# Patient Record
Sex: FEMALE | Race: WHITE | NOT HISPANIC OR LATINO | Employment: FULL TIME | URBAN - METROPOLITAN AREA
[De-identification: names, ages, dates, MRNs, and addresses within clinical notes are randomized per-mention and may not be internally consistent; named-entity substitution may affect disease eponyms.]

---

## 2017-04-04 ENCOUNTER — MYC MEDICAL ADVICE (OUTPATIENT)
Dept: FAMILY MEDICINE | Facility: CLINIC | Age: 32
End: 2017-04-04

## 2017-04-21 ASSESSMENT — PATIENT HEALTH QUESTIONNAIRE - PHQ9: 5. POOR APPETITE OR OVEREATING: SEVERAL DAYS

## 2017-04-21 ASSESSMENT — ANXIETY QUESTIONNAIRES
5. BEING SO RESTLESS THAT IT IS HARD TO SIT STILL: SEVERAL DAYS
GAD7 TOTAL SCORE: 4
3. WORRYING TOO MUCH ABOUT DIFFERENT THINGS: NOT AT ALL
6. BECOMING EASILY ANNOYED OR IRRITABLE: SEVERAL DAYS
2. NOT BEING ABLE TO STOP OR CONTROL WORRYING: NOT AT ALL
1. FEELING NERVOUS, ANXIOUS, OR ON EDGE: SEVERAL DAYS
7. FEELING AFRAID AS IF SOMETHING AWFUL MIGHT HAPPEN: NOT AT ALL

## 2017-04-22 ASSESSMENT — PATIENT HEALTH QUESTIONNAIRE - PHQ9: SUM OF ALL RESPONSES TO PHQ QUESTIONS 1-9: 1

## 2017-04-22 ASSESSMENT — ANXIETY QUESTIONNAIRES: GAD7 TOTAL SCORE: 4

## 2017-06-18 ENCOUNTER — NURSE TRIAGE (OUTPATIENT)
Dept: NURSING | Facility: CLINIC | Age: 32
End: 2017-06-18

## 2017-06-18 NOTE — TELEPHONE ENCOUNTER
"Lindsay states she thinks she might have been bitten by a tick 2 days ago. Noticed a scab on her upper thigh, left leg, about 5 hours after walking in the woods. She then questioned if the \"scab\" was actually a tick. Described as a black dot on her thigh the she scratched off with her fingernail, and then bled a little.  Did not ever see a tick that she knows of.   Additional Information    Tick bite with no complications (all triage questions negative)    Protocols used: TICK BITE-ADULT-    "

## 2017-09-20 ENCOUNTER — OFFICE VISIT (OUTPATIENT)
Dept: FAMILY MEDICINE | Facility: CLINIC | Age: 32
End: 2017-09-20
Payer: COMMERCIAL

## 2017-09-20 VITALS
RESPIRATION RATE: 20 BRPM | HEART RATE: 78 BPM | DIASTOLIC BLOOD PRESSURE: 80 MMHG | TEMPERATURE: 98.1 F | HEIGHT: 71 IN | WEIGHT: 177 LBS | OXYGEN SATURATION: 99 % | SYSTOLIC BLOOD PRESSURE: 127 MMHG | BODY MASS INDEX: 24.78 KG/M2

## 2017-09-20 DIAGNOSIS — J02.9 SORE THROAT: Primary | ICD-10-CM

## 2017-09-20 LAB
BASOPHILS # BLD AUTO: 0 10E9/L (ref 0–0.2)
BASOPHILS NFR BLD AUTO: 0.2 %
DEPRECATED S PYO AG THROAT QL EIA: NORMAL
DIFFERENTIAL METHOD BLD: NORMAL
EOSINOPHIL # BLD AUTO: 0.1 10E9/L (ref 0–0.7)
EOSINOPHIL NFR BLD AUTO: 0.7 %
LYMPHOCYTES # BLD AUTO: 1 10E9/L (ref 0.8–5.3)
LYMPHOCYTES NFR BLD AUTO: 12 %
MONOCYTES # BLD AUTO: 1 10E9/L (ref 0–1.3)
MONOCYTES NFR BLD AUTO: 12.7 %
NEUTROPHILS # BLD AUTO: 6.1 10E9/L (ref 1.6–8.3)
NEUTROPHILS NFR BLD AUTO: 74.4 %
SPECIMEN SOURCE: NORMAL
WBC # BLD AUTO: 8.2 10E9/L (ref 4–11)

## 2017-09-20 PROCEDURE — 85004 AUTOMATED DIFF WBC COUNT: CPT | Performed by: NURSE PRACTITIONER

## 2017-09-20 PROCEDURE — 99213 OFFICE O/P EST LOW 20 MIN: CPT | Performed by: NURSE PRACTITIONER

## 2017-09-20 PROCEDURE — 87081 CULTURE SCREEN ONLY: CPT | Performed by: NURSE PRACTITIONER

## 2017-09-20 PROCEDURE — 85048 AUTOMATED LEUKOCYTE COUNT: CPT | Performed by: NURSE PRACTITIONER

## 2017-09-20 PROCEDURE — 36415 COLL VENOUS BLD VENIPUNCTURE: CPT | Performed by: NURSE PRACTITIONER

## 2017-09-20 PROCEDURE — 87880 STREP A ASSAY W/OPTIC: CPT | Performed by: NURSE PRACTITIONER

## 2017-09-20 NOTE — PATIENT INSTRUCTIONS

## 2017-09-20 NOTE — PROGRESS NOTES
SUBJECTIVE:   Lindsay Burch is a 31 year old female who presents to clinic today for the following health issues:      URI SYMPTOMS    Duration: three days     Description  Lindsay reports that she has been sick for 3 days. When her symptoms the symptoms that she started with a head cold, red irritated eyes, and a sore throat.   She was seen by her eye doctor and was told that her eye symptoms were viral.  She is in clinic today because of worsening sore throat/throat pain, headache and bilateral ear pain.  Congested.   No fever.  Eating and drinking okay.     Severity: severe    Accompanying signs and symptoms: None    History (predisposing factors):  none    Precipitating or alleviating factors: None    Therapies tried and outcome:  Tylenol     Problem list and histories reviewed & adjusted, as indicated.  Additional history: as documented    Patient Active Problem List   Diagnosis     Anxiety     Dysthymia     Hypothyroidism, unspecified hypothyroidism type     Past Surgical History:   Procedure Laterality Date     OPERATIVE HYSTEROSCOPY WITH MORCELLATOR  1/2/2014    Procedure: OPERATIVE HYSTEROSCOPY WITH MORCELLATOR;  OPERATIVE HYSTEROSCOPY, RESECTION OF ENDOMETRIAL POLYP;  Surgeon: Elizabeth Stover MD;  Location: [facility]     wisdom teeth[         Social History   Substance Use Topics     Smoking status: Never Smoker     Smokeless tobacco: Never Used     Alcohol use 0.0 oz/week     0 Standard drinks or equivalent per week      Comment: occasional     Family History   Problem Relation Age of Onset     Hypertension Mother      Hypertension Father          Current Outpatient Prescriptions   Medication Sig Dispense Refill     sertraline (ZOLOFT) 50 MG tablet Take 1.5 tablets (75 mg) by mouth daily 45 tablet 1     levothyroxine (SYNTHROID, LEVOTHROID) 88 MCG tablet Take 1 tablet (88 mcg) by mouth daily 90 tablet 0     VITAMIN D, CHOLECALCIFEROL, PO Take 5,000 Units by mouth daily       Omega-3 Fatty Acids  "(OMEGA-3 FISH OIL PO) Take by mouth daily       PRENATAL VITAMINS PO        No Known Allergies  Recent Labs   Lab Test  04/11/16   1459  03/10/16   1034  05/20/10   LDL   --    --    --   91   HDL   --    --    --   68   TRIG   --    --    --   52   TSH  3.64  3.32   < >   --     < > = values in this interval not displayed.      BP Readings from Last 3 Encounters:   09/20/17 127/80   07/01/16 114/72   04/11/16 98/82    Wt Readings from Last 3 Encounters:   09/20/17 177 lb (80.3 kg)   07/01/16 159 lb (72.1 kg)   04/11/16 167 lb (75.8 kg)            Labs reviewed in EPIC    Reviewed and updated as needed this visit by clinical staff       Reviewed and updated as needed this visit by Provider         ROS:  Constitutional, HEENT, cardiovascular, pulmonary, GI, , musculoskeletal, neuro, skin, endocrine and psych systems are negative, except as otherwise noted.      OBJECTIVE:   /80  Pulse 78  Temp 98.1  F (36.7  C) (Oral)  Resp 20  Ht 5' 10.5\" (1.791 m)  Wt 177 lb (80.3 kg)  SpO2 99%  Breastfeeding? Yes  BMI 25.04 kg/m2  Body mass index is 25.04 kg/(m^2).  General: healthy, alert and moderate distress, tearful  Skin is warm, dry. No rashes present.  HEENT: bilateral TM normal without fluid or erythema, neck has bilateral anterior cervical nodes enlarged and pharynx erythematous without exudate.  Lungs: respirations easy and regular. chest clear to ausculation bilaterally  Heart: S1, S2 normal, no murmur, no gallop, rate regular  Psyche: Good eye contact, tearful at times    Results for orders placed or performed in visit on 09/20/17   WBC with Diff   Result Value Ref Range    WBC 8.2 4.0 - 11.0 10e9/L    Diff Method Automated Method     % Neutrophils 74.4 %    % Lymphocytes 12.0 %    % Monocytes 12.7 %    % Eosinophils 0.7 %    % Basophils 0.2 %    Absolute Neutrophil 6.1 1.6 - 8.3 10e9/L    Absolute Lymphocytes 1.0 0.8 - 5.3 10e9/L    Absolute Monocytes 1.0 0.0 - 1.3 10e9/L    Absolute Eosinophils 0.1 " 0.0 - 0.7 10e9/L    Absolute Basophils 0.0 0.0 - 0.2 10e9/L   Strep, Rapid Screen   Result Value Ref Range    Specimen Description Throat     Rapid Strep A Screen       NEGATIVE: No Group A streptococcal antigen detected by immunoassay, await culture report.         ASSESSMENT/PLAN:     (J02.9) Sore throat  (primary encounter diagnosis)  Comment: Acute  Plan: Strep, Rapid Screen, Beta strep group A         culture, WBC with Diff          The patient was reassured today that her rapid strep test is negative and her total white count is normal. I reassured her that this appears to be a viral infection. She is tearful and clearly uncomfortable today, in night discuss with her that I hope this is the worst day for her. I recommend that she be regimen with pain control. I recommend Tylenol every 4 hours as needed. She can use ibuprofen intermittently for pain as well. Gargles, staying hydrated, throat lozenges, honey, Tea etc.  I encouraged healthy diet.  She is to monitor for red flag symptoms. If her symptoms worsen in anyway or her sore throat becomes more painful, I would consider an oral antibiotic for her. She is to let us know if her symptoms worsen.    RADHA Ramirez [facility]

## 2017-09-20 NOTE — MR AVS SNAPSHOT
After Visit Summary   9/20/2017    Lindsay Burch    MRN: [id number]           Patient Information     Date Of Birth          1985        Visit Information        Provider Department      9/20/2017 3:45 PM Geeta Sam APRN [facility]        Today's Diagnoses     Sore throat    -  1      Care Instructions       * PHARYNGITIS (Sore Throat),REPORT PENDING    Pharyngitis (sore throat) is often due to a virus, but can also be caused by the  strep  bacteria. This is called  strep throat . Both viral and strep infection can cause throat pain that is worse when swallowing, aching all over with headache and fever. Both types of infections are contagious. They may be spread by coughing, kissing or touching others after touching your mouth or nose, so wash your hands often.  A test has been done to determine whether or not you have strep throat. If it is positive for strep infection you will usually need to take antibiotics. If the test is negative, you probably have a viral pharyngitis, and antibiotic treatment will not help you recover.  HOME CARE:    If your symptoms are severe, rest at home for the first 2-3 days. If you are told that your test is positive for strep, you should be off work and school for the first two days of antibiotic treatment. After that, you will no longer be as contagious.    Children: Use acetaminophen (Tylenol) for fever, fussiness or discomfort. In infants over six months of age, you may use ibuprofen (Children's Motrin) instead of Tylenol. [NOTE: If your child has chronic liver or kidney disease or ever had a stomach ulcer or GI bleeding, talk with your doctor before using these medicines.]   (Aspirin should never be used in anyone under 18 years of age who is ill with a fever. It may cause severe liver damage.)  Adults: You may use acetaminophen (Tylenol) 650-1000 mg every 6 hours or ibuprofen (Motrin, Advil) 600 mg every 6-8 hours  with food to control pain, if you are able to take these medicines. [NOTE: If you have chronic liver or kidney disease or ever had a stomach ulcer or GI bleeding, talk with your doctor before using these medicines.]    Throat lozenges or sprays (Chloraseptic and others), or gargling with warm salt water will reduce throat pain. Dissolve 1/2 teaspoon of salt in 1 glass of warm water. This is especially useful just before meals.     FOLLOW UP with your doctor as advised by our staff if you are not improving over the next week.  GET PROMPT MEDICAL ATTENTION  if any of the following occur:    Fever over 101 F (38.3 C) for more than three days    New or worsening ear pain, sinus pain or headache    Unable to swallow liquids or open your mouth wide due to throat pain    Trouble breathing    Muffled voice    New rash       [id number] [facility], [address] [place] [address]. All rights reserved. This information is not intended as a substitute for professional medical care. Always follow your healthcare professional's instructions.            Follow-ups after your visit        Your next 10 appointments already scheduled     Sep 20, 2017  3:45 PM CDT   SHORT with RADHA Chester CNP   [facility]    [address] [place] [id number] [phone]              Who to contact     If you have questions or need follow up information about today's clinic visit or your schedule please contact [facility] directly at [phone].  Normal or non-critical lab and imaging results will be communicated to you by [employer], letter or phone within 4 business days after the clinic has received the results. If you do not hear from us within 7 days, please contact the clinic through [employer] or phone. If you have a critical or abnormal lab result, we will notify you by phone as soon as possible.  Submit refill requests through [employer]  "or call your pharmacy and they will forward the refill request to us. Please allow 3 business days for your refill to be completed.          Additional Information About Your Visit        MyChart Information     [employer] gives you secure access to your electronic health record. If you see a primary care provider, you can also send messages to your care team and make appointments. If you have questions, please call your primary care clinic.  If you do not have a primary care provider, please call [phone] and they will assist you.        Care EveryWhere ID     This is your Care EveryWhere ID. This could be used by other organizations to access your [place] medical records  [id number]        Your Vitals Were     Pulse Temperature Respirations Height Pulse Oximetry Breastfeeding?    78 98.1  F (36.7  C) (Oral) 20 5' 10.5\" (1.791 m) 99% Yes    BMI (Body Mass Index)                   25.04 kg/m2            Blood Pressure from Last 3 Encounters:   09/20/17 127/80   07/01/16 114/72   04/11/16 98/82    Weight from Last 3 Encounters:   09/20/17 177 lb (80.3 kg)   07/01/16 159 lb (72.1 kg)   04/11/16 167 lb (75.8 kg)              We Performed the Following     Beta strep group A culture     Strep, Rapid Screen     WBC with Diff        Primary Care Provider Office Phone # Fax #    Shruti RONALD Lim [phone] [address] [facility] [address]        Equal Access to Services     [facility] : Hadii aad ku hadasho Soomaali, waaxda luqadaha, qaybta kaalmada adeegyada, waxay fatuma mariano ademiguel a byers . So [facility] [phone].    ATENCIÓN: Si habla español, tiene a zelaya disposición servicios gratuitos de asistencia lingüística. Ej al [phone].    We comply with applicable federal civil rights laws and [place] laws. We do not discriminate on the basis of race, color, national origin, age, disability sex, sexual orientation or gender identity.            Thank you!     Thank you for " choosing [facility]  for your care. Our goal is always to provide you with excellent care. Hearing back from our patients is one way we can continue to improve our services. Please take a few minutes to complete the written survey that you may receive in the mail after your visit with us. Thank you!             Your Updated Medication List - Protect others around you: Learn how to safely use, store and throw away your medicines at www.disposemymeds.org.          This list is accurate as of: 9/20/17  2:53 PM.  Always use your most recent med list.                   Brand Name Dispense Instructions for use Diagnosis    levothyroxine 88 MCG tablet    SYNTHROID/LEVOTHROID    90 tablet    Take 1 tablet (88 mcg) by mouth daily    Hypothyroidism, unspecified hypothyroidism type       OMEGA-3 FISH OIL PO      Take by mouth daily        PRENATAL VITAMINS PO           sertraline 50 MG tablet    ZOLOFT    45 tablet    Take 1.5 tablets (75 mg) by mouth daily    Dysthymia, Anxiety       VITAMIN D (CHOLECALCIFEROL) PO      Take 5,000 Units by mouth daily

## 2017-09-21 LAB
BACTERIA SPEC CULT: NORMAL
SPECIMEN SOURCE: NORMAL

## 2017-10-03 ENCOUNTER — OFFICE VISIT (OUTPATIENT)
Dept: FAMILY MEDICINE | Facility: CLINIC | Age: 32
End: 2017-10-03
Payer: COMMERCIAL

## 2017-10-03 VITALS
TEMPERATURE: 98.6 F | RESPIRATION RATE: 16 BRPM | OXYGEN SATURATION: 98 % | BODY MASS INDEX: 24.61 KG/M2 | HEART RATE: 74 BPM | WEIGHT: 174 LBS

## 2017-10-03 DIAGNOSIS — R07.0 THROAT PAIN: Primary | ICD-10-CM

## 2017-10-03 DIAGNOSIS — J06.9 UPPER RESPIRATORY TRACT INFECTION, UNSPECIFIED TYPE: ICD-10-CM

## 2017-10-03 LAB
BASOPHILS # BLD AUTO: 0 10E9/L (ref 0–0.2)
BASOPHILS NFR BLD AUTO: 0.2 %
DEPRECATED S PYO AG THROAT QL EIA: NORMAL
DIFFERENTIAL METHOD BLD: NORMAL
EOSINOPHIL # BLD AUTO: 0.2 10E9/L (ref 0–0.7)
EOSINOPHIL NFR BLD AUTO: 1.7 %
HETEROPH AB SER QL: NEGATIVE
LYMPHOCYTES # BLD AUTO: 1.1 10E9/L (ref 0.8–5.3)
LYMPHOCYTES NFR BLD AUTO: 10.9 %
MONOCYTES # BLD AUTO: 0.8 10E9/L (ref 0–1.3)
MONOCYTES NFR BLD AUTO: 7.5 %
NEUTROPHILS # BLD AUTO: 8.2 10E9/L (ref 1.6–8.3)
NEUTROPHILS NFR BLD AUTO: 79.7 %
SPECIMEN SOURCE: NORMAL
WBC # BLD AUTO: 10.3 10E9/L (ref 4–11)

## 2017-10-03 PROCEDURE — 87081 CULTURE SCREEN ONLY: CPT | Performed by: NURSE PRACTITIONER

## 2017-10-03 PROCEDURE — 87880 STREP A ASSAY W/OPTIC: CPT | Performed by: NURSE PRACTITIONER

## 2017-10-03 PROCEDURE — 86308 HETEROPHILE ANTIBODY SCREEN: CPT | Performed by: NURSE PRACTITIONER

## 2017-10-03 PROCEDURE — 85004 AUTOMATED DIFF WBC COUNT: CPT | Performed by: NURSE PRACTITIONER

## 2017-10-03 PROCEDURE — 36415 COLL VENOUS BLD VENIPUNCTURE: CPT | Performed by: NURSE PRACTITIONER

## 2017-10-03 PROCEDURE — 85048 AUTOMATED LEUKOCYTE COUNT: CPT | Performed by: NURSE PRACTITIONER

## 2017-10-03 PROCEDURE — 99213 OFFICE O/P EST LOW 20 MIN: CPT | Performed by: NURSE PRACTITIONER

## 2017-10-03 RX ORDER — AMOXICILLIN 875 MG
875 TABLET ORAL 2 TIMES DAILY
Qty: 20 TABLET | Refills: 0 | Status: SHIPPED | OUTPATIENT
Start: 2017-10-03 | End: 2018-11-12

## 2017-10-03 ASSESSMENT — PATIENT HEALTH QUESTIONNAIRE - PHQ9: SUM OF ALL RESPONSES TO PHQ QUESTIONS 1-9: 11

## 2017-10-03 NOTE — MR AVS SNAPSHOT
After Visit Summary   10/3/2017    Lindsay Burch    MRN: [id number]           Patient Information     Date Of Birth          1985        Visit Information        Provider Department      10/3/2017 8:40 AM Geeta Sam APRN CNP [facility]        Today's Diagnoses     Throat pain    -  1    Upper respiratory tract infection, unspecified type           Follow-ups after your visit        Who to contact     If you have questions or need follow up information about today's clinic visit or your schedule please contact [facility] directly at [phone].  Normal or non-critical lab and imaging results will be communicated to you by MyChart, letter or phone within 4 business days after the clinic has received the results. If you do not hear from us within 7 days, please contact the clinic through [employer] or phone. If you have a critical or abnormal lab result, we will notify you by phone as soon as possible.  Submit refill requests through [employer] or call your pharmacy and they will forward the refill request to us. Please allow 3 business days for your refill to be completed.          Additional Information About Your Visit        MyChart Information     [employer] gives you secure access to your electronic health record. If you see a primary care provider, you can also send messages to your care team and make appointments. If you have questions, please call your primary care clinic.  If you do not have a primary care provider, please call [phone] and they will assist you.        Care EveryWhere ID     This is your Care EveryWhere ID. This could be used by other organizations to access your [place] medical records  [id number]        Your Vitals Were     Pulse Temperature Respirations Pulse Oximetry Breastfeeding? BMI (Body Mass Index)    74 98.6  F (37  C) (Oral) 16 98% Yes 24.61 kg/m2       Blood Pressure from Last 3 Encounters:   09/20/17  127/80   07/01/16 114/72   04/11/16 98/82    Weight from Last 3 Encounters:   10/03/17 174 lb (78.9 kg)   09/20/17 177 lb (80.3 kg)   07/01/16 159 lb (72.1 kg)              We Performed the Following     Beta strep group A culture     Mononucleosis screen     Strep, Rapid Screen     WBC with Diff          Today's Medication Changes          These changes are accurate as of: 10/3/17 11:18 AM.  If you have any questions, ask your nurse or doctor.               Start taking these medicines.        Dose/Directions    amoxicillin 875 MG tablet   Commonly known as:  AMOXIL   Used for:  Throat pain   Started by:  Geeta Sam APRN CNP        Dose:  875 mg   Take 1 tablet (875 mg) by mouth 2 times daily   Quantity:  20 tablet   Refills:  0            Where to get your medicines      These medications were sent to [place], [place] - [address]  [address] [facility] [address]     Phone:  [phone]     amoxicillin 875 MG tablet                Primary Care Provider Office Phone # Fax #    Shruti Lim [phone] [address] [facility] [[email or web address] A  [facility] [address]        Equal Access to Services     [facility]: Hadii aad ku hadasho Soomaali, waaxda luqadaha, qaybta kaalmada adeegyada, waxay idiin haysharifn baltazar starkey. So [facility] [phone].    ATENCIÓN: Si habla español, tiene a zelaya disposición servicios gratuitos de asistencia lingüística. Llame al [phone].    We comply with applicable federal civil rights laws and [place] laws. We do not discriminate on the basis of race, color, national origin, age, disability, sex, sexual orientation, or gender identity.            Thank you!     Thank you for choosing [facility]  for your care. Our goal is always to provide you with excellent care. Hearing back from our patients is one way we can continue to improve our services. Please take a few minutes to complete the written survey  that you may receive in the mail after your visit with us. Thank you!             Your Updated Medication List - Protect others around you: Learn how to safely use, store and throw away your medicines at www.disposemymeds.org.          This list is accurate as of: 10/3/17 11:18 AM.  Always use your most recent med list.                   Brand Name Dispense Instructions for use Diagnosis    amoxicillin 875 MG tablet    AMOXIL    20 tablet    Take 1 tablet (875 mg) by mouth 2 times daily    Throat pain       levothyroxine 88 MCG tablet    SYNTHROID/LEVOTHROID    90 tablet    Take 1 tablet (88 mcg) by mouth daily    Hypothyroidism, unspecified hypothyroidism type       OMEGA-3 FISH OIL PO      Take by mouth daily        PRENATAL VITAMINS PO           sertraline 50 MG tablet    ZOLOFT    45 tablet    Take 1.5 tablets (75 mg) by mouth daily    Dysthymia, Anxiety       VITAMIN D (CHOLECALCIFEROL) PO      Take 5,000 Units by mouth daily

## 2017-10-03 NOTE — PROGRESS NOTES
SUBJECTIVE:   Lindsay Burch is a 31 year old female who presents to clinic today for the following health issues:    RESPIRATORY SYMPTOMS      Duration: Saturday (9/30/2017)    Description  sore throat    Severity: severe    Accompanying signs and symptoms: recently ill- had improvement for one week.     History (predisposing factors):  strep exposure?    Precipitating or alleviating factors: None    Therapies tried and outcome:  Tylenol, ibuprofen, tea and lozenges.      HPI: Patient presents to the clinic for evaluation of sore throat.  She reports she had similar symptoms 2 weeks ago for which she was seen in the clinic.  Her work up was negative at that time and she was ill for approximately a week. Her symptoms did improve.  She was feeling better but her symptoms returned this Saturday and have progressively and quickly gotten worse.  She reports a sore throat, swollen lymph nodes, difficulty swallowing, and fatigue.  She denies fever, cough, runny nose, or congestion.  She does have mild ear pain.  She has used tylenol, ibuprofen, tea, and lozenges without relief.    2 small children at home/breastfeeding.     Problem list and histories reviewed & adjusted, as indicated.  Additional history: as documented    Patient Active Problem List   Diagnosis     Anxiety     Dysthymia     Hypothyroidism, unspecified hypothyroidism type     Past Surgical History:   Procedure Laterality Date     OPERATIVE HYSTEROSCOPY WITH MORCELLATOR  1/2/2014    Procedure: OPERATIVE HYSTEROSCOPY WITH MORCELLATOR;  OPERATIVE HYSTEROSCOPY, RESECTION OF ENDOMETRIAL POLYP;  Surgeon: Elizabeth Stover MD;  [facility]     wisdom teeth[         Social History   Substance Use Topics     Smoking status: Never Smoker     Smokeless tobacco: Never Used     Alcohol use 0.0 oz/week     0 Standard drinks or equivalent per week      Comment: occasional     Family History   Problem Relation Age of Onset     Hypertension Mother       Hypertension Father          Current Outpatient Prescriptions   Medication Sig Dispense Refill     amoxicillin (AMOXIL) 875 MG tablet Take 1 tablet (875 mg) by mouth 2 times daily 20 tablet 0     sertraline (ZOLOFT) 50 MG tablet Take 1.5 tablets (75 mg) by mouth daily 45 tablet 1     levothyroxine (SYNTHROID, LEVOTHROID) 88 MCG tablet Take 1 tablet (88 mcg) by mouth daily 90 tablet 0     VITAMIN D, CHOLECALCIFEROL, PO Take 5,000 Units by mouth daily       Omega-3 Fatty Acids (OMEGA-3 FISH OIL PO) Take by mouth daily       PRENATAL VITAMINS PO            Reviewed and updated as needed this visit by clinical staff     Reviewed and updated as needed this visit by Provider         ROS:  Constitutional, HEENT, cardiovascular, pulmonary, gi and gu systems are negative, except as otherwise noted.      OBJECTIVE:   Pulse 74  Temp 98.6  F (37  C) (Oral)  Resp 16  Wt 174 lb (78.9 kg)  SpO2 98%  Breastfeeding? Yes  BMI 24.61 kg/m2  Body mass index is 24.61 kg/(m^2).  GENERAL: healthy, alert and mild distress, tearful  EYES: Eyes grossly normal to inspection, PERRL and conjunctivae and sclerae normal  HENT: ear canals erythematous and fluid behind TM's, nose and mouth without ulcers or lesions, tonsils erythematous, Voice strained  NECK: Positive for adenopathy, no asymmetry, masses, or scars and thyroid normal to palpation  RESP: lungs clear to auscultation - no rales, rhonchi or wheezes  CV: regular rate and rhythm, normal S1 S2, no S3 or S4, no murmur, click or rub, no peripheral edema and peripheral pulses strong  ABDOMEN: soft, nontender, no hepatosplenomegaly, no masses and bowel sounds normal  MS: no gross musculoskeletal defects noted, no edema    Results for orders placed or performed in visit on 10/03/17   WBC with Diff   Result Value Ref Range    WBC 10.3 4.0 - 11.0 10e9/L    Diff Method Automated Method     % Neutrophils 79.7 %    % Lymphocytes 10.9 %    % Monocytes 7.5 %    % Eosinophils 1.7 %    % Basophils  0.2 %    Absolute Neutrophil 8.2 1.6 - 8.3 10e9/L    Absolute Lymphocytes 1.1 0.8 - 5.3 10e9/L    Absolute Monocytes 0.8 0.0 - 1.3 10e9/L    Absolute Eosinophils 0.2 0.0 - 0.7 10e9/L    Absolute Basophils 0.0 0.0 - 0.2 10e9/L   Mononucleosis screen   Result Value Ref Range    Mononucleosis Screen Negative NEG^Negative   Strep, Rapid Screen   Result Value Ref Range    Specimen Description Throat     Rapid Strep A Screen       NEGATIVE: No Group A streptococcal antigen detected by immunoassay, await culture report.       ASSESSMENT/PLAN:       1. Upper respiratory tract infection, unspecified type    2. Throat pain  - Strep, Rapid Screen  - Beta strep group A culture  - WBC with Diff  - Mononucleosis screen  - amoxicillin (AMOXIL) 875 MG tablet; Take 1 tablet (875 mg) by mouth 2 times daily  Dispense: 20 tablet; Refill: 0    Work-up for strep and mono negative.  White count normal. Given duration of patient's symptoms and exam, we are Ok prescribing amoxicillin to cover her for a sinus infection with specifically an acutely sore throat. She was instructed to notify us if her symptoms have not improved in 3-5 days. If necessary, an antibiotic change would be considered.    This note was scribed by Evangelina Warren RN, Student NP    In supervising the nurse practitioner student, I have reviewed the ROS and PSFH documented by the student.  I performed the pertinent history, exam and assessment and plan components as documented above.   RADHA Ramirez [facility]

## 2017-10-04 LAB
BACTERIA SPEC CULT: NORMAL
SPECIMEN SOURCE: NORMAL

## 2018-03-04 ENCOUNTER — HEALTH MAINTENANCE LETTER (OUTPATIENT)
Age: 33
End: 2018-03-04

## 2018-07-01 ENCOUNTER — TRANSFERRED RECORDS (OUTPATIENT)
Dept: HEALTH INFORMATION MANAGEMENT | Facility: CLINIC | Age: 33
End: 2018-07-01

## 2018-07-01 LAB — PAP SMEAR - HIM PATIENT REPORTED: NEGATIVE

## 2018-07-26 ENCOUNTER — TRANSFERRED RECORDS (OUTPATIENT)
Dept: HEALTH INFORMATION MANAGEMENT | Facility: CLINIC | Age: 33
End: 2018-07-26

## 2018-11-12 ENCOUNTER — OFFICE VISIT (OUTPATIENT)
Dept: FAMILY MEDICINE | Facility: CLINIC | Age: 33
End: 2018-11-12
Payer: COMMERCIAL

## 2018-11-12 VITALS
OXYGEN SATURATION: 98 % | HEIGHT: 71 IN | TEMPERATURE: 98.4 F | DIASTOLIC BLOOD PRESSURE: 89 MMHG | SYSTOLIC BLOOD PRESSURE: 134 MMHG | RESPIRATION RATE: 20 BRPM | WEIGHT: 170 LBS | BODY MASS INDEX: 23.8 KG/M2 | HEART RATE: 73 BPM

## 2018-11-12 DIAGNOSIS — Z23 NEED FOR PROPHYLACTIC VACCINATION AND INOCULATION AGAINST INFLUENZA: ICD-10-CM

## 2018-11-12 DIAGNOSIS — L30.8 OTHER ECZEMA: ICD-10-CM

## 2018-11-12 DIAGNOSIS — F41.9 ANXIETY: ICD-10-CM

## 2018-11-12 DIAGNOSIS — Z00.00 ROUTINE GENERAL MEDICAL EXAMINATION AT A HEALTH CARE FACILITY: Primary | ICD-10-CM

## 2018-11-12 PROCEDURE — 90686 IIV4 VACC NO PRSV 0.5 ML IM: CPT | Performed by: PHYSICIAN ASSISTANT

## 2018-11-12 PROCEDURE — 90471 IMMUNIZATION ADMIN: CPT | Performed by: PHYSICIAN ASSISTANT

## 2018-11-12 PROCEDURE — 99395 PREV VISIT EST AGE 18-39: CPT | Mod: 25 | Performed by: PHYSICIAN ASSISTANT

## 2018-11-12 PROCEDURE — 99213 OFFICE O/P EST LOW 20 MIN: CPT | Mod: 25 | Performed by: PHYSICIAN ASSISTANT

## 2018-11-12 RX ORDER — BUSPIRONE HYDROCHLORIDE 10 MG/1
10 TABLET ORAL 3 TIMES DAILY
Qty: 90 TABLET | Refills: 1 | Status: SHIPPED | OUTPATIENT
Start: 2018-11-12 | End: 2018-12-18

## 2018-11-12 RX ORDER — CITALOPRAM HYDROBROMIDE 20 MG/1
TABLET ORAL
Qty: 30 TABLET | Refills: 1 | Status: SHIPPED | OUTPATIENT
Start: 2018-11-12 | End: 2018-12-18

## 2018-11-12 RX ORDER — MOMETASONE FUROATE 1 MG/G
CREAM TOPICAL
Qty: 15 G | Refills: 0 | Status: SHIPPED | OUTPATIENT
Start: 2018-11-12 | End: 2020-08-19

## 2018-11-12 ASSESSMENT — ENCOUNTER SYMPTOMS
ABDOMINAL PAIN: 0
DIZZINESS: 0
BREAST MASS: 0
HEMATOCHEZIA: 0
DYSURIA: 0
MYALGIAS: 0
EYE PAIN: 0
HEARTBURN: 0
DIARRHEA: 0
HEADACHES: 0
PARESTHESIAS: 0
FREQUENCY: 0
PALPITATIONS: 0
HEMATURIA: 0
SHORTNESS OF BREATH: 0
COUGH: 0
SORE THROAT: 0
ARTHRALGIAS: 0
CHILLS: 0
NAUSEA: 0
JOINT SWELLING: 0
CONSTIPATION: 0

## 2018-11-12 ASSESSMENT — ANXIETY QUESTIONNAIRES
1. FEELING NERVOUS, ANXIOUS, OR ON EDGE: NEARLY EVERY DAY
5. BEING SO RESTLESS THAT IT IS HARD TO SIT STILL: NEARLY EVERY DAY
2. NOT BEING ABLE TO STOP OR CONTROL WORRYING: SEVERAL DAYS
3. WORRYING TOO MUCH ABOUT DIFFERENT THINGS: MORE THAN HALF THE DAYS
IF YOU CHECKED OFF ANY PROBLEMS ON THIS QUESTIONNAIRE, HOW DIFFICULT HAVE THESE PROBLEMS MADE IT FOR YOU TO DO YOUR WORK, TAKE CARE OF THINGS AT HOME, OR GET ALONG WITH OTHER PEOPLE: VERY DIFFICULT
6. BECOMING EASILY ANNOYED OR IRRITABLE: NEARLY EVERY DAY
GAD7 TOTAL SCORE: 15
7. FEELING AFRAID AS IF SOMETHING AWFUL MIGHT HAPPEN: NOT AT ALL

## 2018-11-12 ASSESSMENT — PATIENT HEALTH QUESTIONNAIRE - PHQ9
SUM OF ALL RESPONSES TO PHQ QUESTIONS 1-9: 11
SUM OF ALL RESPONSES TO PHQ QUESTIONS 1-9: 11
5. POOR APPETITE OR OVEREATING: NEARLY EVERY DAY
10. IF YOU CHECKED OFF ANY PROBLEMS, HOW DIFFICULT HAVE THESE PROBLEMS MADE IT FOR YOU TO DO YOUR WORK, TAKE CARE OF THINGS AT HOME, OR GET ALONG WITH OTHER PEOPLE: VERY DIFFICULT

## 2018-11-12 NOTE — PROGRESS NOTES

## 2018-11-12 NOTE — MR AVS SNAPSHOT
After Visit Summary   11/12/2018    Lindsay Burch    MRN: [id number]           Patient Information     Date Of Birth          1985        Visit Information        Provider Department      11/12/2018 3:20 PM Marissa Martinez PA-C [facility] [place]        Today's Diagnoses     Routine general medical examination at a health care facility    -  1    Anxiety        Other eczema          Care Instructions      Preventive Health Recommendations  Female Ages 26 - 39  Yearly exam:   See your health care provider every year in order to    Review health changes.     Discuss preventive care.      Review your medicines if you your doctor has prescribed any.    Until age 30: Get a Pap test every three years (more often if you have had an abnormal result).    After age 30: Talk to your doctor about whether you should have a Pap test every 3 years or have a Pap test with HPV screening every 5 years.   You do not need a Pap test if your uterus was removed (hysterectomy) and you have not had cancer.  You should be tested each year for STDs (sexually transmitted diseases), if you're at risk.   Talk to your provider about how often to have your cholesterol checked.  If you are at risk for diabetes, you should have a diabetes test (fasting glucose).  Shots: Get a flu shot each year. Get a tetanus shot every 10 years.   Nutrition:     Eat at least 5 servings of fruits and vegetables each day.    Eat whole-grain bread, whole-wheat pasta and brown rice instead of white grains and rice.    Get adequate Calcium and Vitamin D.     Lifestyle    Exercise at least 150 minutes a week (30 minutes a day, 5 days of the week). This will help you control your weight and prevent disease.    Limit alcohol to one drink per day.    No smoking.     Wear sunscreen to prevent skin cancer.    See your dentist every six months for an exam and cleaning.    Discussed the pathophysiology of anxiety/depression episodes  "and the various symptoms seen associated with anxiety episodes. Discussed possible triggers including fatigue, depression, stress, and chemicals such as alcohol, caffeine and certain drugs. Discussed the treatment including an aerobic exercise program, adequate rest, and both rescue meds and maintenance meds.   For your anxiety:   1. Consider therapy - CBT - cognitive behavioral therapy - Gurwinder Barboza's card given to patient.  2. \"The Chemistry of Calm\" by Jermaine Shay   3. \"Hope and Help for your Nerves\" by Chelly Rodas   4. Vitamin D 9224-5663 IU daily +/- B complex  5. Valerian root extract for relaxation and sleep OR Melatonin at bedtime.  Trial of Buspar 10 mg nightly for the first 1-2 weeks then as needed if needed/tolerate for increased anxiety - max dose 30 mg two times a day.     Discussed multifaceted approach to controlling anxiety including self care, counseling, and medication.   Patient is interested in establishing with therapy and starting medication today. Will start Celexa 10 mg (half tab) and titrate up to 20 mg tab (full tab).   Discussed side effects of SSRI including possible increase in suicide ideation in the first few weeks, pt knows to call crisis line or go to ER if this happens.  Discussed clinical effect often delayed until 4-6 weeks.   Discussed taking time for self and spending time with people who provide social support. Follow up in 1-2 months.   Patient to return to clinic in 1 month for follow up then 5-6 months for further refills or sooner with any worsening or changes in symptoms.             Follow-ups after your visit        Who to contact     If you have questions or need follow up information about today's clinic visit or your schedule please contact [facility] directly at [phone].  Normal or non-critical lab and imaging results will be communicated to you by MyChart, letter or phone within 4 business days after the clinic has received the results. If " "you do not hear from us within 7 days, please contact the clinic through [employer] or phone. If you have a critical or abnormal lab result, we will notify you by phone as soon as possible.  Submit refill requests through [employer] or call your pharmacy and they will forward the refill request to us. Please allow 3 business days for your refill to be completed.          Additional Information About Your Visit        [facility] Information     [employer] gives you secure access to your electronic health record. If you see a primary care provider, you can also send messages to your care team and make appointments. If you have questions, please call your primary care clinic.  If you do not have a primary care provider, please call [phone] and they will assist you.        Care EveryWhere ID     This is your Care EveryWhere ID. This could be used by other organizations to access your [facility] medical records  [id number]        Your Vitals Were     Pulse Temperature Respirations Height Last Period Pulse Oximetry    73 98.4  F (36.9  C) (Oral) 20 5' 10.5\" (1.791 m) 10/15/2018 (Approximate) 98%    BMI (Body Mass Index)                   24.05 kg/m2            Blood Pressure from Last 3 Encounters:   11/12/18 134/89   09/20/17 127/80   07/01/16 114/72    Weight from Last 3 Encounters:   11/12/18 170 lb (77.1 kg)   10/03/17 174 lb (78.9 kg)   09/20/17 177 lb (80.3 kg)              Today, you had the following     No orders found for display         Today's Medication Changes          These changes are accurate as of 11/12/18  3:44 PM.  If you have any questions, ask your nurse or doctor.               Start taking these medicines.        Dose/Directions    busPIRone 10 MG tablet   Commonly known as:  BUSPAR   Used for:  Anxiety   Started by:  Marissa Martinez PA-C        Dose:  10 mg   Take 1 tablet (10 mg) by mouth 3 times daily   Quantity:  90 tablet   Refills:  1       citalopram 20 MG tablet   Commonly known as:  " celeXA   Used for:  Anxiety   Started by:  Marissa Martinez PA-C        Take 1/2 tablet (10 mg) for 1-2 weeks, then increase to 1 tablet orally daily   Quantity:  30 tablet   Refills:  1       mometasone 0.1 % cream   Commonly known as:  ELOCON   Used for:  Other eczema   Started by:  Marissa Martinez PA-C        Apply sparingly to affected area twice daily for 14 days.  Do not apply to face.   Quantity:  15 g   Refills:  0         These medicines have changed or have updated prescriptions.        Dose/Directions    levothyroxine 88 MCG tablet   Commonly known as:  SYNTHROID/LEVOTHROID   This may have changed:  how much to take   Used for:  Hypothyroidism, unspecified hypothyroidism type        Dose:  88 mcg   Take 1 tablet (88 mcg) by mouth daily   Quantity:  90 tablet   Refills:  0         Stop taking these medicines if you haven't already. Please contact your care team if you have questions.     sertraline 50 MG tablet   Commonly known as:  ZOLOFT   Stopped by:  Marissa Martinez PA-C                Where to get your medicines      These medications were sent to [facility] [address]  [address] [facility] [address]     Phone:  [phone]     busPIRone 10 MG tablet    citalopram 20 MG tablet    mometasone 0.1 % cream                Primary Care Provider Office Phone # Fax #    Marissa Martinez PA-C [phone] [address]  [facility] [address]        Equal Access to Services     GEGE VILLAREAL : Hadii aad ku hadasho Soomaali, waaxda luqadaha, qaybta kaalmada adeegyada, waxay idiin haysharifn baltazar byers . So [facility] [phone].    ATENCIÓN: Si habla orville, tiene a zelaya disposición servicios gratuitos de asistencia lingüística. Ej sheridan [phone].    We comply with applicable federal civil rights laws and [place] laws. We do not discriminate on the basis of race, color, national origin, age,  disability, sex, sexual orientation, or gender identity.            Thank you!     Thank you for choosing [facility]  for your care. Our goal is always to provide you with excellent care. Hearing back from our patients is one way we can continue to improve our services. Please take a few minutes to complete the written survey that you may receive in the mail after your visit with us. Thank you!             Your Updated Medication List - Protect others around you: Learn how to safely use, store and throw away your medicines at www.disposemymeds.org.          This list is accurate as of 11/12/18  3:44 PM.  Always use your most recent med list.                   Brand Name Dispense Instructions for use Diagnosis    busPIRone 10 MG tablet    BUSPAR    90 tablet    Take 1 tablet (10 mg) by mouth 3 times daily    Anxiety       citalopram 20 MG tablet    celeXA    30 tablet    Take 1/2 tablet (10 mg) for 1-2 weeks, then increase to 1 tablet orally daily    Anxiety       levothyroxine 88 MCG tablet    SYNTHROID/LEVOTHROID    90 tablet    Take 1 tablet (88 mcg) by mouth daily    Hypothyroidism, unspecified hypothyroidism type       mometasone 0.1 % cream    ELOCON    15 g    Apply sparingly to affected area twice daily for 14 days.  Do not apply to face.    Other eczema       OMEGA-3 FISH OIL PO      Take by mouth daily        PRENATAL VITAMINS PO           VITAMIN D (CHOLECALCIFEROL) PO      Take 5,000 Units by mouth daily

## 2018-11-12 NOTE — PATIENT INSTRUCTIONS
"  Preventive Health Recommendations  Female Ages 26 - 39  Yearly exam:   See your health care provider every year in order to    Review health changes.     Discuss preventive care.      Review your medicines if you your doctor has prescribed any.    Until age 30: Get a Pap test every three years (more often if you have had an abnormal result).    After age 30: Talk to your doctor about whether you should have a Pap test every 3 years or have a Pap test with HPV screening every 5 years.   You do not need a Pap test if your uterus was removed (hysterectomy) and you have not had cancer.  You should be tested each year for STDs (sexually transmitted diseases), if you're at risk.   Talk to your provider about how often to have your cholesterol checked.  If you are at risk for diabetes, you should have a diabetes test (fasting glucose).  Shots: Get a flu shot each year. Get a tetanus shot every 10 years.   Nutrition:     Eat at least 5 servings of fruits and vegetables each day.    Eat whole-grain bread, whole-wheat pasta and brown rice instead of white grains and rice.    Get adequate Calcium and Vitamin D.     Lifestyle    Exercise at least 150 minutes a week (30 minutes a day, 5 days of the week). This will help you control your weight and prevent disease.    Limit alcohol to one drink per day.    No smoking.     Wear sunscreen to prevent skin cancer.    See your dentist every six months for an exam and cleaning.    Discussed the pathophysiology of anxiety/depression episodes and the various symptoms seen associated with anxiety episodes. Discussed possible triggers including fatigue, depression, stress, and chemicals such as alcohol, caffeine and certain drugs. Discussed the treatment including an aerobic exercise program, adequate rest, and both rescue meds and maintenance meds.   For your anxiety:   1. Consider therapy - CBT - cognitive behavioral therapy - Gurwinder Barboza's card given to patient.  2. \"The Chemistry " "of Calm\" by Jermaine Shay   3. \"Hope and Help for your Nerves\" by Chelly Rodas   4. Vitamin D 9964-2837 IU daily +/- B complex  5. Valerian root extract for relaxation and sleep OR Melatonin at bedtime.  Trial of Buspar 10 mg nightly for the first 1-2 weeks then as needed if needed/tolerate for increased anxiety - max dose 30 mg two times a day.     Discussed multifaceted approach to controlling anxiety including self care, counseling, and medication.   Patient is interested in establishing with therapy and starting medication today. Will start Celexa 10 mg (half tab) and titrate up to 20 mg tab (full tab).   Discussed side effects of SSRI including possible increase in suicide ideation in the first few weeks, pt knows to call crisis line or go to ER if this happens.  Discussed clinical effect often delayed until 4-6 weeks.   Discussed taking time for self and spending time with people who provide social support. Follow up in 1-2 months.   Patient to return to clinic in 1 month for follow up then 5-6 months for further refills or sooner with any worsening or changes in symptoms.     "

## 2018-11-12 NOTE — Clinical Note
Please abstract the following data from this visit with this patient into the appropriate field in Epic:  Pap smear done on this date: 7/1/2018 (approximately), by this group: [facility], results were normal.

## 2018-11-12 NOTE — PROGRESS NOTES
SUBJECTIVE:   CC: Lindsay Burch is an 33 year old woman who presents for preventive health visit.     Physical   Annual:     Getting at least 3 servings of Calcium per day:  Yes    Bi-annual eye exam:  Yes    Dental care twice a year:  Yes    Sleep apnea or symptoms of sleep apnea:  None    Diet:  Regular (no restrictions)    Frequency of exercise:  2-3 days/week    Duration of exercise:  Other    Taking medications regularly:  Yes    Medication side effects:  Lightheadedness and Other    Additional concerns today:  Yes        Anxiety Follow-Up    Status since last visit: Worsened     Other associated symptoms: super emotional and overwhelmed, short fuse with kids and feels like patient is missing out on her children's life, feeling anxious in social situations as well     Complicating factors:   Significant life event: No   Current substance abuse: None  Depression symptoms: No  Off Zoloft for a few months (maybe 2-3 months ago - stopped breastfeeding son around same time) and wants to get back on it.   Zoloft side effects though: no sex drive, dizziness, fatigue   LUIS A-7 SCORE 6/22/2016 4/21/2017 11/12/2018   Total Score 9 4 15       LUIS A-7      Amount of exercise or physical activity: 2-3 days/week for an average of 30-60 minutes    Problems taking medications regularly: No    Medication side effects: none    Diet: regular but eating healthy     Patient followed by endocrinology for thyroid issues - due to repeat levels in 2 weeks after recent dose adjustments.    She has a dry skin patch on her left knee for a few months as well.    Today's PHQ-2 Score:   PHQ-2 ( 1999 Pfizer) 11/12/2018   Q1: Little interest or pleasure in doing things 2   Q2: Feeling down, depressed or hopeless 2   PHQ-2 Score 4   Q1: Little interest or pleasure in doing things More than half the days   Q2: Feeling down, depressed or hopeless More than half the days   PHQ-2 Score 4       Abuse: Current or Past (Physical, Sexual or  Emotional) - No  Do you feel safe in your environment - Yes    Social History   Substance Use Topics     Smoking status: Never Smoker     Smokeless tobacco: Never Used     Alcohol use 0.0 oz/week     0 Standard drinks or equivalent per week      Comment: occasional     Alcohol Use 11/12/2018   If you drink alcohol do you typically have greater than 3 drinks per day OR greater than 7 drinks per week? No   No flowsheet data found.    Reviewed orders with patient.  Reviewed health maintenance and updated orders accordingly - Yes  Labs reviewed in EPIC  Patient Active Problem List   Diagnosis     Anxiety     Dysthymia     Hypothyroidism, unspecified hypothyroidism type     H/O: HTN (hypertension)     Past Surgical History:   Procedure Laterality Date     OPERATIVE HYSTEROSCOPY WITH MORCELLATOR  1/2/2014    Procedure: OPERATIVE HYSTEROSCOPY WITH MORCELLATOR;  OPERATIVE HYSTEROSCOPY, RESECTION OF ENDOMETRIAL POLYP;  Surgeon: Elizabeth Stover MD;  Location: [facility]     wisdom teeth[         Social History   Substance Use Topics     Smoking status: Never Smoker     Smokeless tobacco: Never Used     Alcohol use 0.0 oz/week     0 Standard drinks or equivalent per week      Comment: occasional     Family History   Problem Relation Age of Onset     Hypertension Mother      Hypertension Father          Current Outpatient Prescriptions   Medication Sig Dispense Refill     busPIRone (BUSPAR) 10 MG tablet Take 1 tablet (10 mg) by mouth 3 times daily 90 tablet 1     citalopram (CELEXA) 20 MG tablet Take 1/2 tablet (10 mg) for 1-2 weeks, then increase to 1 tablet orally daily 30 tablet 1     levothyroxine (SYNTHROID, LEVOTHROID) 88 MCG tablet Take 1 tablet (88 mcg) by mouth daily (Patient taking differently: Take 112 mcg by mouth daily ) 90 tablet 0     mometasone (ELOCON) 0.1 % cream Apply sparingly to affected area twice daily for 14 days.  Do not apply to face. 15 g 0     Omega-3 Fatty Acids (OMEGA-3 FISH OIL PO) Take by mouth  "daily       PRENATAL VITAMINS PO        VITAMIN D, CHOLECALCIFEROL, PO Take 5,000 Units by mouth daily       No Known Allergies    Mammogram not appropriate for this patient based on age.    Pertinent mammograms are reviewed under the imaging tab.  History of abnormal Pap smear: NO - age 30- 65 PAP every 3 years recommended     Reviewed and updated as needed this visit by clinical staff  Tobacco  Allergies  Meds  Problems  Med Hx  Surg Hx  Fam Hx  Soc Hx          Reviewed and updated as needed this visit by Provider  Allergies  Meds  Problems            Review of Systems   Constitutional: Negative for chills.   HENT: Negative for congestion, ear pain, hearing loss and sore throat.    Eyes: Negative for pain and visual disturbance.   Respiratory: Negative for cough and shortness of breath.    Cardiovascular: Negative for chest pain, palpitations and peripheral edema.   Gastrointestinal: Negative for abdominal pain, constipation, diarrhea, heartburn, hematochezia and nausea.   Breasts:  Negative for tenderness, breast mass and discharge.   Genitourinary: Negative for dysuria, frequency, genital sores, hematuria, pelvic pain, urgency, vaginal bleeding and vaginal discharge.   Musculoskeletal: Negative for arthralgias, joint swelling and myalgias.   Skin: Negative for rash.   Neurological: Negative for dizziness, headaches and paresthesias.   Psychiatric/Behavioral: Positive for mood changes.        OBJECTIVE:   /89 (BP Location: Left arm, Patient Position: Sitting, Cuff Size: Adult Regular)  Pulse 73  Temp 98.4  F (36.9  C) (Oral)  Resp 20  Ht 5' 10.5\" (1.791 m)  Wt 170 lb (77.1 kg)  LMP 10/15/2018 (Approximate)  SpO2 98%  BMI 24.05 kg/m2  Physical Exam  GENERAL: healthy, alert and no distress  EYES: Eyes grossly normal to inspection, PERRL and conjunctivae and sclerae normal  HENT: ear canals and TM's normal, nose and mouth without ulcers or lesions  NECK: no adenopathy, no asymmetry, masses, " "or scars and thyroid normal to palpation  RESP: lungs clear to auscultation - no rales, rhonchi or wheezes  BREAST: normal without masses, tenderness or nipple discharge and no palpable axillary masses or adenopathy  CV: regular rate and rhythm, normal S1 S2, no S3 or S4, no murmur, click or rub, no peripheral edema and peripheral pulses strong  ABDOMEN: soft, nontender, no hepatosplenomegaly, no masses and bowel sounds normal  MS: no gross musculoskeletal defects noted, no edema  SKIN: dime sized, raised, dry patch with white scales on left lower knee  NEURO: Normal strength and tone, mentation intact and speech normal  PSYCH: mentation appears normal, affect normal/bright    Diagnostic Test Results:  none     ASSESSMENT/PLAN:       ICD-10-CM    1. Routine general medical examination at a health care facility Z00.00    2. Anxiety F41.9 Various options discussed with patient at length. Trial per patient instructions and follow up in 1 month.  busPIRone (BUSPAR) 10 MG tablet     citalopram (CELEXA) 20 MG tablet   3. Other eczema L30.8 Trial of topical steroid sent to pharmacy x 2 weeks, if no improvement follow up with dermatology.  mometasone (ELOCON) 0.1 % cream   4. Need for prophylactic vaccination and inoculation against influenza Z23 FLU VACCINE, SPLIT VIRUS, IM (QUADRIVALENT) [40037]- >3 YRS     Vaccine Administration, Initial [32720]       COUNSELING:  Reviewed preventive health counseling, as reflected in patient instructions       Regular exercise       Healthy diet/nutrition       Vision screening    BP Readings from Last 1 Encounters:   11/12/18 134/89     Estimated body mass index is 24.05 kg/(m^2) as calculated from the following:    Height as of this encounter: 5' 10.5\" (1.791 m).    Weight as of this encounter: 170 lb (77.1 kg).       reports that she has never smoked. She has never used smokeless tobacco.      Counseling Resources:  ATP IV Guidelines  Pooled Cohorts Equation Calculator  Breast Cancer " Risk Calculator  FRAX Risk Assessment  ICSI Preventive Guidelines  Dietary Guidelines for Americans, 2010  [employer] MyPlate  ASA Prophylaxis  Lung CA Screening    Marissa Martinez PA-C  [facility]    Answers for HPI/ROS submitted by the patient on 11/12/2018   PHQ-2 Score: 4  If you checked off any problems, how difficult have these problems made it for you to do your work, take care of things at home, or get along with other people?: Very difficult  PHQ9 TOTAL SCORE: 11

## 2018-11-13 ASSESSMENT — ANXIETY QUESTIONNAIRES: GAD7 TOTAL SCORE: 15

## 2018-11-13 ASSESSMENT — PATIENT HEALTH QUESTIONNAIRE - PHQ9: SUM OF ALL RESPONSES TO PHQ QUESTIONS 1-9: 11

## 2018-12-06 ENCOUNTER — MYC MEDICAL ADVICE (OUTPATIENT)
Dept: FAMILY MEDICINE | Facility: CLINIC | Age: 33
End: 2018-12-06

## 2019-02-18 ENCOUNTER — OFFICE VISIT (OUTPATIENT)
Dept: FAMILY MEDICINE | Facility: CLINIC | Age: 34
End: 2019-02-18
Payer: COMMERCIAL

## 2019-02-18 VITALS
DIASTOLIC BLOOD PRESSURE: 77 MMHG | WEIGHT: 167 LBS | HEART RATE: 99 BPM | BODY MASS INDEX: 23.38 KG/M2 | SYSTOLIC BLOOD PRESSURE: 126 MMHG | HEIGHT: 71 IN | TEMPERATURE: 98.5 F | RESPIRATION RATE: 21 BRPM | OXYGEN SATURATION: 100 %

## 2019-02-18 DIAGNOSIS — N63.10 BREAST MASS, RIGHT: ICD-10-CM

## 2019-02-18 DIAGNOSIS — F41.9 ANXIETY: ICD-10-CM

## 2019-02-18 DIAGNOSIS — J20.9 ACUTE BRONCHITIS, UNSPECIFIED ORGANISM: Primary | ICD-10-CM

## 2019-02-18 PROCEDURE — 99214 OFFICE O/P EST MOD 30 MIN: CPT | Performed by: PHYSICIAN ASSISTANT

## 2019-02-18 RX ORDER — AZITHROMYCIN 250 MG/1
TABLET, FILM COATED ORAL
Qty: 6 TABLET | Refills: 0 | Status: SHIPPED | OUTPATIENT
Start: 2019-02-18 | End: 2019-03-01

## 2019-02-18 RX ORDER — CITALOPRAM HYDROBROMIDE 10 MG/1
10 TABLET ORAL DAILY
Qty: 90 TABLET | Refills: 3 | Status: SHIPPED | OUTPATIENT
Start: 2019-02-18 | End: 2019-07-10

## 2019-02-18 ASSESSMENT — PATIENT HEALTH QUESTIONNAIRE - PHQ9
SUM OF ALL RESPONSES TO PHQ QUESTIONS 1-9: 1
5. POOR APPETITE OR OVEREATING: SEVERAL DAYS

## 2019-02-18 ASSESSMENT — ANXIETY QUESTIONNAIRES
3. WORRYING TOO MUCH ABOUT DIFFERENT THINGS: NOT AT ALL
IF YOU CHECKED OFF ANY PROBLEMS ON THIS QUESTIONNAIRE, HOW DIFFICULT HAVE THESE PROBLEMS MADE IT FOR YOU TO DO YOUR WORK, TAKE CARE OF THINGS AT HOME, OR GET ALONG WITH OTHER PEOPLE: SOMEWHAT DIFFICULT
6. BECOMING EASILY ANNOYED OR IRRITABLE: SEVERAL DAYS
GAD7 TOTAL SCORE: 2
7. FEELING AFRAID AS IF SOMETHING AWFUL MIGHT HAPPEN: NOT AT ALL
5. BEING SO RESTLESS THAT IT IS HARD TO SIT STILL: NOT AT ALL
2. NOT BEING ABLE TO STOP OR CONTROL WORRYING: NOT AT ALL
1. FEELING NERVOUS, ANXIOUS, OR ON EDGE: NOT AT ALL

## 2019-02-18 ASSESSMENT — MIFFLIN-ST. JEOR: SCORE: 1549.67

## 2019-02-18 NOTE — PATIENT INSTRUCTIONS
Continue with Celexa 10 mg daily - continue with Buspar 10 mg as needed as well.  Refills to be sent to pharmacy as needed.    Call Central Scheduling [phone] to schedule your mammogram at your convenience.      Encouraged mucinex/guafenisin, warm salt water gargles, cepacol spray, soothers/lozenges, sinus rinses (neilmed), flonase (2 sprays per nostril daily x 2 weeks), vitamin c, fluids and rest.  May alternate tylenol and NSAIDS (ibuprofen, advil, aleve type products) every 4-6 hours for the next few days as needed.    Prescription for zpack (antibiotic) given just in case symptoms not improving x 5 days.  Return to clinic with any worsening or changes in symptoms.

## 2019-02-18 NOTE — PROGRESS NOTES
SUBJECTIVE:   Lindsay Burch is a 33 year old female who presents to clinic today for the following health issues:    Anxiety Follow-Up    Status since last visit: Improved     Other associated symptoms:None    Complicating factors:   Significant life event: No   Current substance abuse: None  Depression symptoms: No  Patient taking Celexa 10 mg daily (higher dose feels like too much for her) and Buspar 10 mg at nightly then in am during cycle.  LUIS A-7 SCORE 4/21/2017 11/12/2018 2/18/2019   Total Score 4 15 2       LUIS A-7      Breast Problem    Duration: 1 week    Description (location/character/radiation): right breast- Possible pulled muscle from doing home workouts     Intensity:  moderate    Accompanying signs and symptoms: feels sore    History (similar episodes/previous evaluation): None    Precipitating or alleviating factors: None    Therapies tried and outcome: None    History of breast feeding ~ 6 months ago.    LMP - 2 weeks ago; regular/monthly       RESPIRATORY SYMPTOMS    Duration: 1 week    Description  nasal congestion, sore throat, facial pain/pressure, ear pain both, headache, fatigue/malaise and myalgias    Severity: moderate    Accompanying signs and symptoms: None    History (predisposing factors):  none    Precipitating or alleviating factors: None    Therapies tried and outcome:  Therafandreina advil     Patient followed by endocrinology for thyroid issue - taking medicine regularly with no issues and god control at this time.    Problem list and histories reviewed & adjusted, as indicated.  Additional history: as documented    Patient Active Problem List   Diagnosis     Anxiety     Dysthymia     Hypothyroidism, unspecified hypothyroidism type     H/O: HTN (hypertension)     Past Surgical History:   Procedure Laterality Date     OPERATIVE HYSTEROSCOPY WITH MORCELLATOR  1/2/2014    Procedure: OPERATIVE HYSTEROSCOPY WITH MORCELLATOR;  OPERATIVE HYSTEROSCOPY, RESECTION OF ENDOMETRIAL POLYP;   "Surgeon: Elizabeth Stover MD;  Location: [facility]     wisdom teeth[         Social History     Tobacco Use     Smoking status: Never Smoker     Smokeless tobacco: Never Used   Substance Use Topics     Alcohol use: Yes     Alcohol/week: 0.0 oz     Comment: occasional     Family History   Problem Relation Age of Onset     Hypertension Mother      Hypertension Father          Current Outpatient Medications   Medication Sig Dispense Refill     azithromycin (ZITHROMAX) 250 MG tablet Take 2 tablets (500 mg) by mouth daily for 1 day, THEN 1 tablet (250 mg) daily for 4 days. 6 tablet 0     busPIRone (BUSPAR) 10 MG tablet TAKE 1 TABLET (10 MG) BY MOUTH 3 TIMES DAILY 90 tablet 0     citalopram (CELEXA) 10 MG tablet Take 1 tablet (10 mg) by mouth daily 90 tablet 3     levothyroxine (SYNTHROID, LEVOTHROID) 88 MCG tablet Take 1 tablet (88 mcg) by mouth daily (Patient taking differently: Take 112 mcg by mouth daily ) 90 tablet 0     mometasone (ELOCON) 0.1 % cream Apply sparingly to affected area twice daily for 14 days.  Do not apply to face. 15 g 0     Omega-3 Fatty Acids (OMEGA-3 FISH OIL PO) Take by mouth daily       PRENATAL VITAMINS PO        VITAMIN D, CHOLECALCIFEROL, PO Take 5,000 Units by mouth daily       No Known Allergies  Labs reviewed in EPIC    Reviewed and updated as needed this visit by clinical staff  Tobacco  Allergies  Meds  Problems  Med Hx  Surg Hx  Fam Hx  Soc Hx        Reviewed and updated as needed this visit by Provider  Tobacco  Allergies  Meds  Problems  Med Hx  Surg Hx  Fam Hx         ROS:  Constitutional, HEENT, cardiovascular, pulmonary, GI, , musculoskeletal, neuro, skin, endocrine and psych systems are negative, except as otherwise noted.    OBJECTIVE:     /77 (BP Location: Left arm, Patient Position: Sitting, Cuff Size: Adult Regular)   Pulse 99   Temp 98.5  F (36.9  C) (Oral)   Resp 21   Ht 1.797 m (5' 10.75\")   Wt 75.8 kg (167 lb)   SpO2 100%   BMI 23.46 kg/m  "   Body mass index is 23.46 kg/m .  GENERAL: healthy, alert and no distress  EYES: Eyes grossly normal to inspection, PERRL and conjunctivae and sclerae normal  HENT: ear canals and TM's normal, nose and mouth without ulcers or lesions  NECK: no adenopathy, no asymmetry, masses, or scars and thyroid normal to palpation  RESP: lungs clear to auscultation - no rales, rhonchi or wheezes  CV: regular rate and rhythm, normal S1 S2, no S3 or S4, no murmur, click or rub, no peripheral edema and peripheral pulses strong  PSYCH: mentation appears normal, affect normal/bright  BREAST: overall within normal limits but right lateral quadrant near nipple with slight fullness but no distinct mass/tendernes/erythema or change in skin texture; no tenderness or nipple discharge and no palpable axillary masses or adenopathy    Diagnostic Test Results:  none     ASSESSMENT/PLAN:   (J20.9) Acute bronchitis, unspecified organism  (primary encounter diagnosis)  Comment: new onset  Plan: azithromycin (ZITHROMAX) 250 MG tablet        See patient instructions and prescription for oral antibiotic given if no improvement in a few days.    (F41.9) Anxiety  Comment: Long standing, chronic, controlled  Plan: citalopram (CELEXA) 10 MG tablet        Continue with celexa 10 mg abdomen buspar as needed - refills sent to pharmacy today.    (N63.10) Breast mass, right  Comment: new onset  Plan: MA Diagnostic Digital Right, US Breast Right         Limited 1-3 Quadrants        Monitor for a few cycles but referral/scheduling information given for diagnostic mammogram is still worried or symptoms continue.          ICD-10-CM    1. Acute bronchitis, unspecified organism J20.9 azithromycin (ZITHROMAX) 250 MG tablet   2. Anxiety F41.9 citalopram (CELEXA) 10 MG tablet   3. Breast mass, right N63.10 MA Diagnostic Digital Right     US Breast Right Limited 1-3 Quadrants       Patient Instructions   Continue with Celexa 10 mg daily - continue with Buspar 10 mg as  needed as well.  Refills to be sent to pharmacy as needed.    Call Central Scheduling [phone] to schedule your mammogram at your convenience.      Encouraged mucinex/guafenisin, warm salt water gargles, cepacol spray, soothers/lozenges, sinus rinses (neilmed), flonase (2 sprays per nostril daily x 2 weeks), vitamin c, fluids and rest.  May alternate tylenol and NSAIDS (ibuprofen, advil, aleve type products) every 4-6 hours for the next few days as needed.    Prescription for zpack (antibiotic) given just in case symptoms not improving x 5 days.  Return to clinic with any worsening or changes in symptoms.       Marissa Martinez PA-C  [facility]

## 2019-02-19 ASSESSMENT — ANXIETY QUESTIONNAIRES: GAD7 TOTAL SCORE: 2

## 2019-02-21 ENCOUNTER — NURSE TRIAGE (OUTPATIENT)
Dept: NURSING | Facility: CLINIC | Age: 34
End: 2019-02-21

## 2019-02-21 NOTE — TELEPHONE ENCOUNTER
Given RX to treat sore throat and was told to start if her throat isn't improved in a few days. She now has sores on her tonsils. She will start the azithromycin. I advised she call back if there is no stabilization or improvement after 3 full days of antibiotic.  Emy Sorto RN-[facility]  .

## 2019-03-01 ENCOUNTER — OFFICE VISIT (OUTPATIENT)
Dept: URGENT CARE | Facility: URGENT CARE | Age: 34
End: 2019-03-01
Payer: COMMERCIAL

## 2019-03-01 ENCOUNTER — TELEPHONE (OUTPATIENT)
Dept: FAMILY MEDICINE | Facility: CLINIC | Age: 34
End: 2019-03-01

## 2019-03-01 VITALS
RESPIRATION RATE: 14 BRPM | OXYGEN SATURATION: 100 % | HEART RATE: 76 BPM | BODY MASS INDEX: 23.38 KG/M2 | DIASTOLIC BLOOD PRESSURE: 62 MMHG | HEIGHT: 71 IN | SYSTOLIC BLOOD PRESSURE: 118 MMHG | WEIGHT: 167 LBS | TEMPERATURE: 98.8 F

## 2019-03-01 DIAGNOSIS — J02.9 VIRAL PHARYNGITIS: Primary | ICD-10-CM

## 2019-03-01 DIAGNOSIS — R07.0 THROAT PAIN: ICD-10-CM

## 2019-03-01 LAB
DEPRECATED S PYO AG THROAT QL EIA: NORMAL
SPECIMEN SOURCE: NORMAL

## 2019-03-01 PROCEDURE — 87880 STREP A ASSAY W/OPTIC: CPT | Performed by: NURSE PRACTITIONER

## 2019-03-01 PROCEDURE — 99213 OFFICE O/P EST LOW 20 MIN: CPT | Performed by: NURSE PRACTITIONER

## 2019-03-01 PROCEDURE — 87081 CULTURE SCREEN ONLY: CPT | Performed by: NURSE PRACTITIONER

## 2019-03-01 ASSESSMENT — MIFFLIN-ST. JEOR: SCORE: 1549.67

## 2019-03-01 NOTE — PATIENT INSTRUCTIONS
1.  Push fluids - drink 80 oz a day  2.  Saline nasal rinse - saline spray or rinse like a neti pot  3.  Humidifier - could put eucalyptus or peppermint or rosemary essential oil in the humidifier water  4.  Steamy bathroom  - breathe in the steamy air  5.  [employer] - [employer] or at [facility]-  6.  Hot Toddy - aris, lemon, honey and hot water +/- cayenne    Ibuprofen 600 mg every 6 hours around clock

## 2019-03-01 NOTE — TELEPHONE ENCOUNTER
PT was seen 2/18/19.  Took the zpack.  Finished the antibiotic on 2/25/19.  Sx came back- white dots in throat.  Tongue is swollen.  Taste buds are inflamed.  Irritation of throat.  No fever.    Rec pt be seen again today.  Should be tested for strep.  [facility] No appts at  clinic.  Reviewed home cares for sx and being contagious.  ARIELLE Moreno

## 2019-03-01 NOTE — PROGRESS NOTES
"  SUBJECTIVE:   Lindsay Burch is a 33 year old female who presents to clinic today for the following health issues:      Acute Illness   Acute illness concerns: sore throat  Onset: 3 weeks - got better and then     Fever: no    Chills/Sweats: no    Headache (location?): no    Sinus Pressure:no    Conjunctivitis:  no    Ear Pain: no    Rhinorrhea: YES    Congestion: YES- resolving    Sore Throat: YES- red, white patches     Cough: YES - before zpak, now just phlegm    Wheeze: no    Decreased Appetite: no    Nausea: no    Vomiting: no    Fatigue/Achiness: YES    Sick/Strep Exposure: no, but kids are 2 and 4 years and have started to seem ill     Therapies Tried and outcome: flonase initially azithromycin - last dose was Monday, Wednesday      Problem list and histories reviewed & adjusted, as indicated.  Additional history: as documented    Reviewed and updated as needed this visit by clinical staff  Tobacco  Allergies  Meds       Reviewed and updated as needed this visit by Provider         ROS:    ROS:5 point ROS including CONST, HEENT, Respiratory, CV, and GI other than that noted in the HPI,  is negative         OBJECTIVE:     /62   Pulse 76   Temp 98.8  F (37.1  C) (Oral)   Resp 14   Ht 1.797 m (5' 10.75\")   Wt 75.8 kg (167 lb)   LMP 02/22/2019   SpO2 100%   Breastfeeding? No   BMI 23.46 kg/m    Body mass index is 23.46 kg/m .  GENERAL: healthy, alert and no distress  EYES: Eyes grossly normal to inspection, PERRL and conjunctivae and sclerae normal  HENT: normal cephalic/atraumatic, ear canals and TM's normal, nose and mouth without ulcers or lesions, nasal mucosa edematous , oropharynx clear, oral mucous membranes moist, tonsillar erythema and tonsillar exudate  NECK: mild bilateral anterior cervical adenopathy, no asymmetry, masses  RESP: lungs clear to auscultation - no rales, rhonchi or wheezes  CV: regular rate and rhythm, normal S1 S2, no S3 or S4, no murmur, click or " rub,    Diagnostic Test Results:  Results for orders placed or performed in visit on 03/01/19 (from the past 24 hour(s))   Strep, Rapid Screen   Result Value Ref Range    Specimen Description Throat     Rapid Strep A Screen       NEGATIVE: No Group A streptococcal antigen detected by immunoassay, await culture report.       ASSESSMENT/PLAN:       (J02.9) Viral pharyngitis  (primary encounter diagnosis)  Comment:   Plan:     (R07.0) Throat pain  Comment:   Plan: Strep, Rapid Screen, Beta strep group A culture              Patient Instructions   1.  Push fluids - drink 80 oz a day  2.  Saline nasal rinse - saline spray or rinse like a neti pot  3.  Humidifier - could put eucalyptus or peppermint or rosemary essential oil in the humidifier water  4.  Steamy bathroom  - breathe in the steamy air  5.  [employer] - Tea Source or at [employer]  6.  Hot Toddy - aris, lemon, honey and hot water +/- cayenne    Ibuprofen 600 mg every 6 hours around [facility] Provider  [facility]

## 2019-03-02 LAB
BACTERIA SPEC CULT: NORMAL
SPECIMEN SOURCE: NORMAL

## 2019-04-22 DIAGNOSIS — F41.9 ANXIETY: ICD-10-CM

## 2019-04-22 RX ORDER — BUSPIRONE HYDROCHLORIDE 10 MG/1
10 TABLET ORAL 3 TIMES DAILY
Qty: 270 TABLET | Refills: 3 | Status: SHIPPED | OUTPATIENT
Start: 2019-04-22 | End: 2019-07-10

## 2019-04-22 NOTE — TELEPHONE ENCOUNTER
"Requested Prescriptions   Pending Prescriptions Disp Refills     busPIRone (BUSPAR) 10 MG tablet [Pharmacy Med Name: BUSPIRONE HCL 10 MG TABLET 10 TAB] 90 tablet 11     Sig: TAKE 1 TABLET (10 MG) BY MOUTH 3 TIMES DAILY  Last Written Prescription Date:  1/10/2019  Last Fill Quantity: 90 tablet,  # refills: 0   Last Office Visit: 2/18/2019   Future Office Visit:            Atypical Antidepressants Protocol Passed - 4/22/2019  8:45 AM        Passed - Recent (12 mo) or future (30 days) visit within the authorizing provider's specialty     Patient had office visit in the last 12 months or has a visit in the next 30 days with authorizing provider or within the authorizing provider's specialty.  See \"Patient Info\" tab in inbasket, or \"Choose Columns\" in Meds & Orders section of the refill encounter.              Passed - Medication active on med list        Passed - Patient is age 18 or older        Passed - No active pregnancy on record        Passed - No positive pregnancy test in past 12 mos          "

## 2019-04-22 NOTE — TELEPHONE ENCOUNTER
Prescription approved per [facility] Refill Protocol.    Signed Prescriptions:                        Disp   Refills    busPIRone (BUSPAR) 10 MG tablet            270 ta*3        Sig: TAKE 1 TABLET (10 MG) BY MOUTH 3 TIMES DAILY  Authorizing Provider: ORI PERAZA  Ordering User: MARTA SANCHEZ      Closing encounter - no further actions needed at this time    Marta Sanchez RN

## 2019-05-14 ENCOUNTER — ANCILLARY PROCEDURE (OUTPATIENT)
Dept: MAMMOGRAPHY | Facility: CLINIC | Age: 34
End: 2019-05-14
Payer: COMMERCIAL

## 2019-05-14 DIAGNOSIS — N63.10 BREAST MASS, RIGHT: ICD-10-CM

## 2019-05-22 ENCOUNTER — MYC REFILL (OUTPATIENT)
Dept: FAMILY MEDICINE | Facility: CLINIC | Age: 34
End: 2019-05-22

## 2019-05-22 DIAGNOSIS — F41.9 ANXIETY: ICD-10-CM

## 2019-05-22 RX ORDER — CITALOPRAM HYDROBROMIDE 10 MG/1
10 TABLET ORAL DAILY
Qty: 0.1 TABLET | Refills: 0 | OUTPATIENT
Start: 2019-05-22

## 2019-05-23 NOTE — TELEPHONE ENCOUNTER
"Requested Prescriptions   Pending Prescriptions Disp Refills     citalopram (CELEXA) 10 MG tablet 90 tablet 3     Sig: Take 1 tablet (10 mg) by mouth daily       SSRIs Protocol Passed - 5/22/2019  8:51 AM        Passed - Recent (12 mo) or future (30 days) visit within the authorizing provider's specialty     Patient had office visit in the last 12 months or has a visit in the next 30 days with authorizing provider or within the authorizing provider's specialty.  See \"Patient Info\" tab in inbasket, or \"Choose Columns\" in Meds & Orders section of the refill encounter.              Passed - Medication is active on med list        Passed - Patient is age 18 or older        Passed - No active pregnancy on record        Passed - No positive pregnancy test in last 12 months        Refused Prescriptions:                       Disp   Refills    citalopram (CELEXA) 10 MG tablet           0.1 ta*0        Sig: Take 1 tablet (10 mg) by mouth daily  Refused By: MARTA SANCHEZ  Reason for Refusal: Duplicate      "

## 2019-05-28 ENCOUNTER — MYC REFILL (OUTPATIENT)
Dept: FAMILY MEDICINE | Facility: CLINIC | Age: 34
End: 2019-05-28

## 2019-05-28 DIAGNOSIS — F41.9 ANXIETY: ICD-10-CM

## 2019-05-28 RX ORDER — CITALOPRAM HYDROBROMIDE 10 MG/1
10 TABLET ORAL DAILY
Qty: 90 TABLET | Refills: 3 | Status: CANCELLED | OUTPATIENT
Start: 2019-05-28

## 2019-05-28 NOTE — TELEPHONE ENCOUNTER
A year's supply was sent to [facility] in February.  Patient will have [facility] pull the refills.  Valerie Tucker RN

## 2019-05-28 NOTE — TELEPHONE ENCOUNTER
Pt says completely out of medication , calling to check status and requests a call when it is ready.

## 2019-06-24 ENCOUNTER — E-VISIT (OUTPATIENT)
Dept: FAMILY MEDICINE | Facility: CLINIC | Age: 34
End: 2019-06-24
Payer: COMMERCIAL

## 2019-06-24 DIAGNOSIS — F34.1 DYSTHYMIA: Primary | ICD-10-CM

## 2019-06-24 PROCEDURE — 99444 ZZC PHYSICIAN ONLINE EVALUATION & MANAGEMENT SERVICE: CPT | Performed by: NURSE PRACTITIONER

## 2019-06-24 RX ORDER — CITALOPRAM HYDROBROMIDE 20 MG/1
20 TABLET ORAL DAILY
Qty: 30 TABLET | Refills: 0 | Status: SHIPPED | OUTPATIENT
Start: 2019-06-24 | End: 2019-06-26

## 2019-06-24 ASSESSMENT — PATIENT HEALTH QUESTIONNAIRE - PHQ9
SUM OF ALL RESPONSES TO PHQ QUESTIONS 1-9: 11
10. IF YOU CHECKED OFF ANY PROBLEMS, HOW DIFFICULT HAVE THESE PROBLEMS MADE IT FOR YOU TO DO YOUR WORK, TAKE CARE OF THINGS AT HOME, OR GET ALONG WITH OTHER PEOPLE: VERY DIFFICULT
SUM OF ALL RESPONSES TO PHQ QUESTIONS 1-9: 11

## 2019-06-24 ASSESSMENT — ANXIETY QUESTIONNAIRES
4. TROUBLE RELAXING: NEARLY EVERY DAY
1. FEELING NERVOUS, ANXIOUS, OR ON EDGE: NEARLY EVERY DAY
2. NOT BEING ABLE TO STOP OR CONTROL WORRYING: SEVERAL DAYS
3. WORRYING TOO MUCH ABOUT DIFFERENT THINGS: SEVERAL DAYS
7. FEELING AFRAID AS IF SOMETHING AWFUL MIGHT HAPPEN: NOT AT ALL
6. BECOMING EASILY ANNOYED OR IRRITABLE: NEARLY EVERY DAY
7. FEELING AFRAID AS IF SOMETHING AWFUL MIGHT HAPPEN: NOT AT ALL
GAD7 TOTAL SCORE: 13
GAD7 TOTAL SCORE: 13
5. BEING SO RESTLESS THAT IT IS HARD TO SIT STILL: MORE THAN HALF THE DAYS
GAD7 TOTAL SCORE: 13

## 2019-06-25 DIAGNOSIS — F34.1 DYSTHYMIA: ICD-10-CM

## 2019-06-25 ASSESSMENT — PATIENT HEALTH QUESTIONNAIRE - PHQ9: SUM OF ALL RESPONSES TO PHQ QUESTIONS 1-9: 11

## 2019-06-25 ASSESSMENT — ANXIETY QUESTIONNAIRES: GAD7 TOTAL SCORE: 13

## 2019-06-25 NOTE — TELEPHONE ENCOUNTER
"Requested Prescriptions   Pending Prescriptions Disp Refills     citalopram (CELEXA) 20 MG tablet  **Requesting a 90 day supply required by ins.**  Last Written Prescription Date:  6-24-19  Last Fill Quantity: 30 tab,  # refills: 0   Last office visit: 2/18/2019 with prescribing provider:  NORBERTO Martinez   Future Office Visit:   Next 5 appointments (look out 90 days)    Jul 10, 2019  8:00 AM CDT  [facility] [address] [place] [id number]  [phone]         30 tablet 0     Sig: Take 1 tablet (20 mg) by mouth daily       SSRIs Protocol Passed - 6/25/2019  2:26 PM        Passed - PHQ-9 score less than 5 in past 6 months     Please review last PHQ-9 score.           Passed - Medication is active on med list        Passed - Patient is age 18 or older        Passed - No active pregnancy on record        Passed - No positive pregnancy test in last 12 months        Passed - Recent (6 mo) or future (30 days) visit within the authorizing provider's specialty     Patient had office visit in the last 6 months or has a visit in the next 30 days with authorizing provider or within the authorizing provider's specialty.  See \"Patient Info\" tab in inbasket, or \"Choose Columns\" in Meds & Orders section of the refill encounter.             "

## 2019-06-26 RX ORDER — CITALOPRAM HYDROBROMIDE 20 MG/1
20 TABLET ORAL DAILY
Qty: 90 TABLET | Refills: 0 | Status: SHIPPED | OUTPATIENT
Start: 2019-06-26 | End: 2019-07-10

## 2019-06-26 NOTE — TELEPHONE ENCOUNTER
Liza Tamez CNP/DOD:  Please review/sign or advise for refill request of: citalopram (CELEXA) 20 MG tablet    Routing refill request to provider for review/approval because:  Request per insurance is for 90-day supply. Per E-Visit submission, provider only wrote for 30 tablets.     Can patient have 90-day supply?    Thank You!  Patience Hightower, RN  Triage Nurse

## 2019-06-26 NOTE — TELEPHONE ENCOUNTER
90 day is denied.    This is a dose change and I want her to follow up so we can evaluate how she is responding to the new dose.

## 2019-07-10 ENCOUNTER — OFFICE VISIT (OUTPATIENT)
Dept: FAMILY MEDICINE | Facility: CLINIC | Age: 34
End: 2019-07-10
Payer: COMMERCIAL

## 2019-07-10 VITALS
BODY MASS INDEX: 24.3 KG/M2 | TEMPERATURE: 97.6 F | RESPIRATION RATE: 18 BRPM | SYSTOLIC BLOOD PRESSURE: 131 MMHG | HEART RATE: 60 BPM | DIASTOLIC BLOOD PRESSURE: 80 MMHG | WEIGHT: 173 LBS | OXYGEN SATURATION: 100 %

## 2019-07-10 DIAGNOSIS — F41.9 ANXIETY: ICD-10-CM

## 2019-07-10 DIAGNOSIS — F34.1 DYSTHYMIA: Primary | ICD-10-CM

## 2019-07-10 PROCEDURE — 99214 OFFICE O/P EST MOD 30 MIN: CPT | Performed by: NURSE PRACTITIONER

## 2019-07-10 RX ORDER — BUSPIRONE HYDROCHLORIDE 10 MG/1
10 TABLET ORAL 2 TIMES DAILY
Qty: 270 TABLET | Refills: 2
Start: 2019-07-10 | End: 2019-08-12

## 2019-07-10 NOTE — PROGRESS NOTES
Subjective     Lindsay Burch is a 33 year old female who presents to clinic today for the following health issues:    HPI   Medication Followup of celexa    Taking Medication as prescribed: yes    Side Effects:  Facial pressure, headache     Medication Helping Symptoms:  yes   She did an e-visit a couple of weeks ago when I was on vacation.  She was having more fatigue, more anhedonia.  Her dose of Celexa was increased to 20 mg, which she did very slowly, so basically has only been on this dose for the past few days.  She now feels like she is in a fog, has trouble concentrating, feels like she is gaining weight, more dry mouth.  She does not feel like she can continue on this medication.  She has been on Zoloft in the past.  She has only been taking Buspar at bedtime, but does add a dose the week before her period for increased anxiety and this works well and she tolerates this well.  She is seeing a therapist.  She does have hypothyroidism, sees endocrinology, last level checked recently and was normal.              Reviewed and updated as needed this visit by Provider         Review of Systems   ROS COMP: CONSTITUTIONAL: NEGATIVE for fever, chills, change in weight  ENT/MOUTH: NEGATIVE for ear, mouth and throat problems  RESP: NEGATIVE for significant cough or SOB  CV: NEGATIVE for chest pain, palpitations or peripheral edema  GI: NEGATIVE for nausea, abdominal pain, heartburn, or change in bowel habits  MUSCULOSKELETAL: NEGATIVE for significant arthralgias or myalgia  PSYCHIATRIC: see HPI      Objective    /80 (BP Location: Left arm, Patient Position: Sitting, Cuff Size: Adult Regular)   Pulse 60   Temp 97.6  F (36.4  C) (Oral)   Resp 18   Wt 78.5 kg (173 lb)   SpO2 100%   BMI 24.30 kg/m    Body mass index is 24.3 kg/m .  Physical Exam   GENERAL: healthy, alert and no distress  PSYCH: mentation appears normal, affect teary            Assessment & Plan     1. Anxiety  Worsening  Will increase  Buspar to 10 mg BID.  See below.   - busPIRone (BUSPAR) 10 MG tablet; Take 1 tablet (10 mg) by mouth 2 times daily  Dispense: 270 tablet; Refill: 2    2. Dysthymia  Medication side effects; symptoms not controlled.  Patient Instructions   Decrease Celexa to 10 mg for one week then stop.  Increase Buspar to twice daily.  Update me with how you're feeling in a couple of weeks.      Depending on residual symptoms, may consider increasing dose of Buspar if anxiety is not controlled or consider starting a low dose of Wellbutrin  mg for more depression symptoms.              Return in about 2 weeks (around 7/24/2019), or if symptoms worsen or fail to improve.    Shruti Lim, NP  [facility]

## 2019-07-10 NOTE — PATIENT INSTRUCTIONS
Decrease Celexa to 10 mg for one week then stop.  Increase Buspar to twice daily.  Update me with how you're feeling in a couple of weeks.

## 2019-09-19 DIAGNOSIS — F34.1 DYSTHYMIA: ICD-10-CM

## 2019-09-19 NOTE — TELEPHONE ENCOUNTER
"Requested Prescriptions   Pending Prescriptions Disp Refills     citalopram (CELEXA) 20 MG tablet [Pharmacy Med Name: CITALOPRAM HBR 20 MG TABLET]  Last Written Prescription Date:  7-10-19  Last Fill Quantity: 90 tab,  # refills: 0   Last office visit: 7/10/2019 with prescribing provider:  Carito Lim    Future Office Visit:    90 tablet 0     Sig: TAKE 1 TABLET BY MOUTH EVERY DAY       SSRIs Protocol Failed - 9/19/2019  2:00 AM        Failed - PHQ-9 score less than 5 in past 6 months     Please review last PHQ-9 score.   PHQ-9 SCORE 11/12/2018 2/18/2019 6/24/2019   PHQ-9 Total Score MyChart 11 (Moderate depression) - 11 (Moderate depression)   PHQ-9 Total Score 11 1 11     LUIS A-7 SCORE 11/12/2018 2/18/2019 6/24/2019   Total Score - - 13 (moderate anxiety)   Total Score 15 2 13           Failed - Medication is active on med list        Passed - Patient is age 18 or older        Passed - No active pregnancy on record        Passed - No positive pregnancy test in last 12 months        Passed - Recent (6 mo) or future (30 days) visit within the authorizing provider's specialty     Patient had office visit in the last 6 months or has a visit in the next 30 days with authorizing provider or within the authorizing provider's specialty.  See \"Patient Info\" tab in inbasket, or \"Choose Columns\" in Meds & Orders section of the refill encounter.             "

## 2019-09-21 RX ORDER — CITALOPRAM HYDROBROMIDE 20 MG/1
TABLET ORAL
Qty: 90 TABLET | Refills: 0 | OUTPATIENT
Start: 2019-09-21

## 2019-09-21 NOTE — TELEPHONE ENCOUNTER
This Order Has Been Discontinued     Order Status Reason By On   Discontinued Stop at Discharge Shruti Lim NP 7/10/19 0899

## 2020-03-02 ENCOUNTER — HEALTH MAINTENANCE LETTER (OUTPATIENT)
Age: 35
End: 2020-03-02

## 2020-03-31 DIAGNOSIS — F41.9 ANXIETY: ICD-10-CM

## 2020-03-31 RX ORDER — BUSPIRONE HYDROCHLORIDE 10 MG/1
TABLET ORAL
Qty: 270 TABLET | Refills: 3 | Status: SHIPPED | OUTPATIENT
Start: 2020-03-31 | End: 2020-07-22

## 2020-06-29 ENCOUNTER — VIRTUAL VISIT (OUTPATIENT)
Dept: FAMILY MEDICINE | Facility: OTHER | Age: 35
End: 2020-06-29

## 2020-06-29 NOTE — PROGRESS NOTES
"Date: 2020 14:56:48  Clinician: William España  Clinician NPI: [id number]  Patient: Lindsay Burch  Patient : 1985  Patient Address: [address], [place] [address]  Patient Phone: [phone]  Visit Protocol: URI  Patient Summary:  Lindsay is a 34 year old ( : 1985 ) female who initiated a Visit for COVID-19 (Coronavirus) evaluation and screening. When asked the question \"Please sign me up to receive news, health information and promotions from [employer].\", Lindsay responded \"No\".    Lindsay states her symptoms started 1-2 days ago.   Her symptoms consist of nausea, rhinitis, malaise, a headache, a sore throat, myalgia, a cough, and nasal congestion.   Symptom details     Nasal secretions: The color of her mucus is white and clear.    Cough: Lindsay coughs a few times an hour and her cough is more bothersome at night. Phlegm does not come into her throat when she coughs. She does not believe her cough is caused by post-nasal drip.     Sore throat: Lindsay reports having moderate throat pain (4-6 on a 10 point pain scale), does not have exudate on her tonsils, and can swallow liquids. The lymph nodes in her neck are not enlarged. A rash has not appeared on the skin since the sore throat started.     Headache: She states the headache is moderate (4-6 on a 10 point pain scale).      Lindsay denies having wheezing, teeth pain, ageusia, diarrhea, vomiting, ear pain, chills, enlarged lymph nodes, anosmia, facial pain or pressure, and fever. She also denies having recent facial or sinus surgery in the past 60 days and taking antibiotic medication in the past month. She is not experiencing dyspnea.   Precipitating events  Lindsay is not sure if she has been exposed to someone with strep throat. She has not recently been exposed to someone with influenza. Lindsay has been in close contact with the following high risk individuals: children under the age of 5.   Pertinent COVID-19 (Coronavirus) " information  In the past 14 days, Lindsay has not worked in a congregate living setting.   She does not work or volunteer as healthcare worker or a  and does not work or volunteer in a healthcare facility.   Lindsay also has not lived in a congregate living setting in the past 14 days. She does not live with a healthcare worker.   Lindsay has not had a close contact with a laboratory-confirmed COVID-19 patient within 14 days of symptom onset.   Pertinent medical history  Lindsay does not get yeast infections when she takes antibiotics.   Lindsay does not need a return to work/school note.   Weight: 164 lbs   Lindsay does not smoke or use smokeless tobacco.   She is not sure if she is pregnant and denies breastfeeding. She has menstruated in the past month.   Additional information as reported by the patient (free text): My son (3) was tested for strep yesterday 6/28 (negative). He just got tested for Covid at [facility]. He has the same symptoms at me.   Weight: 164 lbs    MEDICATIONS: Prenatal oral, buspirone oral, liothyronine oral, levothyroxine oral, ALLERGIES: NKDA  Clinician Response:  Dear Lindsay,   Your symptoms show that you may have coronavirus (COVID-19). This illness can cause fever, cough and trouble breathing. Many people get a mild case and get better on their own. Some people can get very sick.  What should I do?  We would like to test you for this virus.   1. Please call [phone] to schedule your visit. Explain that you were referred by [facility] to have a COVID-19 test. Be ready to share your [facility] visit ID number.  The following will serve as your written order for this COVID Test, ordered by me, for the indication of suspected COVID [Z20.828]: The test will be ordered in "[employer]", our electronic health record, after you are scheduled. It will show as ordered and authorized by Eliu Blankenship MD.  Order: COVID-19 (Coronavirus) PCR [facility]OM[facility]IC [facility].      2. When it's  "time for your COVID test:  Stay at least 6 feet away from others. (If someone will drive you to your test, stay in the backseat, as far away from the  as you can.)   Cover your mouth and nose with a mask, tissue or washcloth.  Go straight to the testing site. Don't make any stops on the way there or back.      3.Starting now: Stay home and away from others (self-isolate) until:   You've had no fever---and no medicine that reduces fever---for 3 full days (72 hours). And...   Your other symptoms have gotten better. For example, your cough or breathing has improved. And...   At least 10 days have passed since your symptoms started.       During this time, don't leave the house except for testing or medical care.   Stay in your own room, even for meals. Use your own bathroom if you can.   Stay away from others in your home. No hugging, kissing or shaking hands. No visitors.  Don't go to work, school or anywhere else.    Clean \"high touch\" surfaces often (doorknobs, counters, handles, etc.). Use a household cleaning spray or wipes. You'll find a full list of  on the EPA website: www.epa.gov/pesticide-registration/list-n-disinfectants-use-against-sars-cov-2.   Cover your mouth and nose with a mask, tissue or washcloth to avoid spreading germs.  Wash your hands and face often. Use soap and water.  Caregivers in these groups are at risk for severe illness due to COVID-19:  o People 65 years and older  o People who live in a nursing home or long-term care facility  o People with chronic disease (lung, heart, cancer, diabetes, kidney, liver, immunologic)  o People who have a weakened immune system, including those who:   Are in cancer treatment  Take medicine that weakens the immune system, such as corticosteroids  Had a bone marrow or organ transplant  Have an immune deficiency  Have poorly controlled HIV or AIDS  Are obese (body mass index of 40 or higher)  Smoke regularly   o Caregivers should wear gloves " while washing dishes, handling laundry and cleaning bedrooms and bathrooms.  o Use caution when washing and drying laundry: Don't shake dirty laundry, and use the warmest water setting that you can.  o For more tips, go to www.cdc.gov/coronavirus/2019-ncov/downloads/10Things.pdf.      How can I take care of myself?   Get lots of rest. Drink extra fluids (unless a doctor has told you not to).   Take Tylenol (acetaminophen) for fever or pain. If you have liver or kidney problems, ask your family doctor if it's okay to take Tylenol.   Adults can take either:    650 mg (two 325 mg pills) every 4 to 6 hours, or...   1,000 mg (two 500 mg pills) every 8 hours as needed.    Note: Don't take more than 3,000 mg in one day. Acetaminophen is found in many medicines (both prescribed and over-the-counter medicines). Read all labels to be sure you don't take too much.   For children, check the Tylenol bottle for the right dose. The dose is based on the child's age or weight.    If you have other health problems (like cancer, heart failure, an organ transplant or severe kidney disease): Call your specialty clinic if you don't feel better in the next 2 days.       Know when to call 911. Emergency warning signs include:    Trouble breathing or shortness of breath Pain or pressure in the chest that doesn't go away Feeling confused like you haven't felt before, or not being able to wake up Bluish-colored lips or face.  Where can I get more information?   [facility] -- About COVID-19: [email or web address]   CDC -- What to Do If You're Sick: www.cdc.gov/coronavirus/2019-ncov/about/steps-when-sick.html   CDC -- Ending Home Isolation: www.cdc.gov/coronavirus/2019-ncov/hcp/disposition-in-home-patients.html   CDC -- Caring for Someone: www.cdc.gov/coronavirus/2019-ncov/if-you-are-sick/care-for-someone.html   [facility] -- Interim Guidance for Hospital Discharge to Home:  www.health.[facility].[place]/diseases/coronavirus/hcp/hospdischarge.pdf   [facility] clinical trials (COVID-19 research studies): clinicalaffairs.[facility]/umn-clinical-trials    Below are the COVID-19 hotlines at the [facility] ([facility]). Interpreters are available.    For health questions: Call 534-296-0850 or 1-724.200.6693 (7 a.m. to 7 p.m.) For questions about schools and childcare: Call 711-857-0871 or 1-734.642.3395 (7 a.m. to 7 p.m.)    Diagnosis: Contact with and (suspected) exposure to other viral communicable diseases  Diagnosis ICD: Z20.828

## 2020-07-01 DIAGNOSIS — R05.9 COUGH: Primary | ICD-10-CM

## 2020-07-01 PROCEDURE — 99207 ZZC NO BILLABLE SERVICE THIS VISIT: CPT

## 2020-07-01 PROCEDURE — U0003 INFECTIOUS AGENT DETECTION BY NUCLEIC ACID (DNA OR RNA); SEVERE ACUTE RESPIRATORY SYNDROME CORONAVIRUS 2 (SARS-COV-2) (CORONAVIRUS DISEASE [COVID-19]), AMPLIFIED PROBE TECHNIQUE, MAKING USE OF HIGH THROUGHPUT TECHNOLOGIES AS DESCRIBED BY CMS-2020-01-R: HCPCS | Performed by: FAMILY MEDICINE

## 2020-07-02 LAB
SARS-COV-2 RNA SPEC QL NAA+PROBE: NOT DETECTED
SPECIMEN SOURCE: NORMAL

## 2020-08-18 ASSESSMENT — ENCOUNTER SYMPTOMS
FEVER: 0
CHILLS: 0
BREAST MASS: 0
COUGH: 0
FREQUENCY: 0
CONSTIPATION: 0
PALPITATIONS: 0
EYE PAIN: 0
DIARRHEA: 0
MYALGIAS: 0
SHORTNESS OF BREATH: 0
JOINT SWELLING: 0
HEMATURIA: 0
NERVOUS/ANXIOUS: 1
HEARTBURN: 0
HEADACHES: 0
ARTHRALGIAS: 0
DIZZINESS: 0
PARESTHESIAS: 0
NAUSEA: 0
HEMATOCHEZIA: 0
SORE THROAT: 0
ABDOMINAL PAIN: 0
WEAKNESS: 0
DYSURIA: 0

## 2020-08-19 ENCOUNTER — OFFICE VISIT (OUTPATIENT)
Dept: FAMILY MEDICINE | Facility: CLINIC | Age: 35
End: 2020-08-19
Payer: COMMERCIAL

## 2020-08-19 VITALS
BODY MASS INDEX: 22.62 KG/M2 | RESPIRATION RATE: 16 BRPM | WEIGHT: 161.6 LBS | TEMPERATURE: 98.5 F | OXYGEN SATURATION: 100 % | SYSTOLIC BLOOD PRESSURE: 105 MMHG | HEIGHT: 71 IN | DIASTOLIC BLOOD PRESSURE: 81 MMHG | HEART RATE: 77 BPM

## 2020-08-19 DIAGNOSIS — F41.9 ANXIETY: ICD-10-CM

## 2020-08-19 DIAGNOSIS — Z00.00 ROUTINE GENERAL MEDICAL EXAMINATION AT A HEALTH CARE FACILITY: Primary | ICD-10-CM

## 2020-08-19 DIAGNOSIS — I83.812 VARICOSE VEINS OF LEFT LOWER EXTREMITY WITH PAIN: ICD-10-CM

## 2020-08-19 LAB
CHOLEST SERPL-MCNC: 175 MG/DL
HDLC SERPL-MCNC: 69 MG/DL
LDLC SERPL CALC-MCNC: 90 MG/DL
NONHDLC SERPL-MCNC: 106 MG/DL
TRIGL SERPL-MCNC: 82 MG/DL

## 2020-08-19 PROCEDURE — 99395 PREV VISIT EST AGE 18-39: CPT | Performed by: NURSE PRACTITIONER

## 2020-08-19 PROCEDURE — 36415 COLL VENOUS BLD VENIPUNCTURE: CPT | Performed by: NURSE PRACTITIONER

## 2020-08-19 PROCEDURE — 80061 LIPID PANEL: CPT | Performed by: NURSE PRACTITIONER

## 2020-08-19 PROCEDURE — 99213 OFFICE O/P EST LOW 20 MIN: CPT | Mod: 25 | Performed by: NURSE PRACTITIONER

## 2020-08-19 RX ORDER — BUSPIRONE HYDROCHLORIDE 10 MG/1
TABLET ORAL
Qty: 270 TABLET | Refills: 3 | Status: SHIPPED | OUTPATIENT
Start: 2020-08-19 | End: 2021-10-14

## 2020-08-19 ASSESSMENT — ANXIETY QUESTIONNAIRES
5. BEING SO RESTLESS THAT IT IS HARD TO SIT STILL: SEVERAL DAYS
IF YOU CHECKED OFF ANY PROBLEMS ON THIS QUESTIONNAIRE, HOW DIFFICULT HAVE THESE PROBLEMS MADE IT FOR YOU TO DO YOUR WORK, TAKE CARE OF THINGS AT HOME, OR GET ALONG WITH OTHER PEOPLE: VERY DIFFICULT
1. FEELING NERVOUS, ANXIOUS, OR ON EDGE: MORE THAN HALF THE DAYS
2. NOT BEING ABLE TO STOP OR CONTROL WORRYING: MORE THAN HALF THE DAYS
3. WORRYING TOO MUCH ABOUT DIFFERENT THINGS: MORE THAN HALF THE DAYS
GAD7 TOTAL SCORE: 13
6. BECOMING EASILY ANNOYED OR IRRITABLE: MORE THAN HALF THE DAYS
7. FEELING AFRAID AS IF SOMETHING AWFUL MIGHT HAPPEN: SEVERAL DAYS

## 2020-08-19 ASSESSMENT — ENCOUNTER SYMPTOMS
EYE PAIN: 0
BREAST MASS: 0
HEADACHES: 0
CONSTIPATION: 0
ABDOMINAL PAIN: 0
PARESTHESIAS: 0
COUGH: 0
NAUSEA: 0
HEMATURIA: 0
PALPITATIONS: 0
WEAKNESS: 0
JOINT SWELLING: 0
FEVER: 0
DIZZINESS: 0
FREQUENCY: 0
ARTHRALGIAS: 0
DIARRHEA: 0
HEMATOCHEZIA: 0
HEARTBURN: 0
SHORTNESS OF BREATH: 0
NERVOUS/ANXIOUS: 1
DYSURIA: 0
CHILLS: 0
MYALGIAS: 0
SORE THROAT: 0

## 2020-08-19 ASSESSMENT — PATIENT HEALTH QUESTIONNAIRE - PHQ9
SUM OF ALL RESPONSES TO PHQ QUESTIONS 1-9: 4
5. POOR APPETITE OR OVEREATING: NEARLY EVERY DAY

## 2020-08-19 ASSESSMENT — MIFFLIN-ST. JEOR: SCORE: 1528.1

## 2020-08-19 NOTE — PROGRESS NOTES
SUBJECTIVE:   CC: Lindsay Burch is an 34 year old woman who presents for preventive health visit.     Healthy Habits:     Getting at least 3 servings of Calcium per day:  Yes    Bi-annual eye exam:  Yes    Dental care twice a year:  Yes    Sleep apnea or symptoms of sleep apnea:  None    Diet:  Regular (no restrictions)    Frequency of exercise:  2-3 days/week    Duration of exercise:  15-30 minutes    Taking medications regularly:  Yes    Medication side effects:  None    PHQ-2 Total Score: 1    Additional concerns today:  Yes      She has been having a lot more anxiety for the past several months.  She is worrying constantly, feels dread, has ruminating thoughts, feels like she can't relax.  She has not been able to tolerate ssris in the past.  She is on Buspar 10 mg TID, but about half of the time forgets to take the morning dose.  She will be talking with a therapist through [facility] this week for the first time.      Vein pain in the left leg X 4 to 6 weeks   She has occasional aching in her left leg, notices a bumpy vein.    She does have a family history of varicose veins.    Today's PHQ-2 Score:   PHQ-2 ( 1999 Pfizer) 8/18/2020   Q1: Little interest or pleasure in doing things 1   Q2: Feeling down, depressed or hopeless 0   PHQ-2 Score 1   Q1: Little interest or pleasure in doing things Several days   Q2: Feeling down, depressed or hopeless Not at all   PHQ-2 Score 1       Abuse: Current or Past(Physical, Sexual or Emotional)- No  Do you feel safe in your environment? Yes        Social History     Tobacco Use     Smoking status: Never Smoker     Smokeless tobacco: Never Used   Substance Use Topics     Alcohol use: Yes     Alcohol/week: 0.0 standard drinks     Comment: occasional     If you drink alcohol do you typically have >3 drinks per day or >7 drinks per week? No    Alcohol Use 8/19/2020   Prescreen: >3 drinks/day or >7 drinks/week? -   Prescreen: >3 drinks/day or >7 drinks/week? No       Reviewed  "orders with patient.  Reviewed health maintenance and updated orders accordingly - Yes          Pertinent mammograms are reviewed under the imaging tab.  History of abnormal Pap smear: NO - age 30- 65 PAP every 3 years recommended     Reviewed and updated as needed this visit by clinical staff  Tobacco  Allergies  Meds  Med Hx  Surg Hx  Fam Hx  Soc Hx        Reviewed and updated as needed this visit by Provider            Review of Systems   Constitutional: Negative for chills and fever.   HENT: Negative for congestion, ear pain, hearing loss and sore throat.    Eyes: Negative for pain and visual disturbance.   Respiratory: Negative for cough and shortness of breath.    Cardiovascular: Positive for chest pain. Negative for palpitations and peripheral edema.   Gastrointestinal: Negative for abdominal pain, constipation, diarrhea, heartburn, hematochezia and nausea.   Breasts:  Negative for tenderness, breast mass and discharge.   Genitourinary: Negative for dysuria, frequency, genital sores, hematuria, pelvic pain, urgency, vaginal bleeding and vaginal discharge.   Musculoskeletal: Negative for arthralgias, joint swelling and myalgias.   Skin: Negative for rash.   Neurological: Negative for dizziness, weakness, headaches and paresthesias.   Psychiatric/Behavioral: Positive for mood changes. The patient is nervous/anxious.           OBJECTIVE:   /81   Pulse 77   Temp 98.5  F (36.9  C) (Oral)   Resp 16   Ht 1.81 m (5' 11.25\")   Wt 73.3 kg (161 lb 9.6 oz)   LMP 08/02/2020   SpO2 100%   BMI 22.38 kg/m    Physical Exam  GENERAL: healthy, alert and no distress  EYES: Eyes grossly normal to inspection, PERRL and conjunctivae and sclerae normal  HENT: ear canals and TM's normal, nose and mouth without ulcers or lesions  NECK: no adenopathy, no asymmetry, masses, or scars and thyroid normal to palpation  RESP: lungs clear to auscultation - no rales, rhonchi or wheezes  BREAST: normal without masses, " "tenderness or nipple discharge and no palpable axillary masses or adenopathy  CV: regular rate and rhythm, normal S1 S2, no S3 or S4, no murmur, click or rub, no peripheral edema and peripheral pulses strong  ABDOMEN: soft, nontender, no hepatosplenomegaly, no masses and bowel sounds normal  MS: varicose vein left lateral leg thigh to calf  SKIN: no suspicious lesions or rashes  NEURO: Normal strength and tone, mentation intact and speech normal  PSYCH: mentation appears normal, affect anxious; LUIS A-7 score of 13        ASSESSMENT/PLAN:   (Z00.00) Routine general medical examination at a health care facility  (primary encounter diagnosis)  Comment:   Plan: Lipid panel reflex to direct LDL Fasting            (F41.9) Anxiety  Comment: not controlled  Plan: busPIRone (BUSPAR) 10 MG tablet, OFFICE/OUTPT         VISIT,SAWYER BETTS III        Discussed ways to try to remember to take her medication more consistently.   She will follow up with the therapist through [facility].  She will follow up in one month.  Consider increasing dose of Buspar if needed.     (I83.812) Varicose veins of left lower extremity with pain  Comment:   Plan: Compression Sleeve/Stocking Order, VASCULAR         SURGERY REFERRAL, OFFICE/OUTPT VISIT,SAWYER BETTS         III        Will order thigh-high compression stockings.  Will also refer to vein specialist for discussion of other treatment options.       COUNSELING:  Reviewed preventive health counseling, as reflected in patient instructions    Estimated body mass index is 22.38 kg/m  as calculated from the following:    Height as of this encounter: 1.81 m (5' 11.25\").    Weight as of this encounter: 73.3 kg (161 lb 9.6 oz).         reports that she has never smoked. She has never used smokeless tobacco.      Counseling Resources:  ATP IV Guidelines  Pooled Cohorts Equation Calculator  Breast Cancer Risk Calculator  FRAX Risk Assessment  ICSI Preventive Guidelines  Dietary Guidelines for Americans, 2010  USDA's " MyPlate  ASA Prophylaxis  Lung CA Screening    Shruti Lim NP  [facility] (Durable Medical Equipment) Orders and Documentation  Orders Placed This Encounter   Procedures     Compression Sleeve/Stocking Order      The patient was assessed and it was determined the patient is in need of the following listed DME Supplies/Equipment. Please complete supporting documentation below to demonstrate medical necessity.      Compression Sleeve/Stocking(s) Supplies Documentation  The patient needs compression stockings for varicose vein

## 2020-08-20 ASSESSMENT — ANXIETY QUESTIONNAIRES: GAD7 TOTAL SCORE: 13

## 2020-09-03 ENCOUNTER — OFFICE VISIT (OUTPATIENT)
Dept: VASCULAR SURGERY | Facility: CLINIC | Age: 35
End: 2020-09-03
Payer: COMMERCIAL

## 2020-09-03 DIAGNOSIS — I83.812 VARICOSE VEINS OF LEFT LOWER EXTREMITY WITH PAIN: Primary | ICD-10-CM

## 2020-09-03 PROCEDURE — 99203 OFFICE O/P NEW LOW 30 MIN: CPT | Performed by: SURGERY

## 2020-09-03 NOTE — PROGRESS NOTES
I the pleasure of seeing Mrs. Lindsay Burch in the vein clinic today.  She is a 34-year-old female who is been referred to us for left lower extremity varicose veins.  She tells me that she has noted the varicose veins since her pregnancies and that of her children.  She has a 5-year-old and a 3-year-old child.  She tells me that if she has been standing she starts experiencing discomfort with pain and heaviness in the varicose veins.  Elevation alleviate the problem.  He does consider this to be lifestyle limiting as she lives an active life including physical fitness.    He has not had any thrombophlebitis or deep venous thrombosis.  He does not notice any symptoms in the right lower extremity.    Past medical history is noted for anxiety and depression.    Past surgical history is noted for uterine polypectomy prior to her pregnancy.    She works from home and is in .    Family history is positive for varicose veins in her father and paternal grandmother.  Paternal grandmother had to undergo stripping procedure.    Examination she appears comfortable and she is in no acute distress.  I had her standing up and examine the veins in the left lower extremity she has got varicose veins running from the outer upper leg and on the outside of the thigh and going towards the left groin.  I do not feel any evidence of thrombophlebitis.    VCSS - LEFT: 4  CEAP - LEFT: C2    She has symptomatic left lower extremity varicose veins.  These are lifestyle limiting due to her active habits.  I have advised that she start wearing thigh-high compression stockings.  We will see her back in 3 months for clinic visit and we will perform venous duplex sonography of the left lower extremity venous system.

## 2020-09-03 NOTE — LETTER
9/3/2020         RE: Lindsay Burch  [address]  [place] [address]        Dear Colleague,    Thank you for referring your patient, Lindsay Burch, to the [facility]. Please see a copy of my visit note below.    I the pleasure of seeing Mrs. Lindsay Burch in the vein clinic today.  She is a 34-year-old female who is been referred to us for left lower extremity varicose veins.  She tells me that she has noted the varicose veins since her pregnancies and that of her children.  She has a 5-year-old and a 3-year-old child.  She tells me that if she has been standing she starts experiencing discomfort with pain and heaviness in the varicose veins.  Elevation alleviate the problem.  He does consider this to be lifestyle limiting as she lives an active life including physical fitness.    He has not had any thrombophlebitis or deep venous thrombosis.  He does not notice any symptoms in the right lower extremity.    Past medical history is noted for anxiety and depression.    Past surgical history is noted for uterine polypectomy prior to her pregnancy.    She works from home and is in .    Family history is positive for varicose veins in her father and paternal grandmother.  Paternal grandmother had to undergo stripping procedure.    Examination she appears comfortable and she is in no acute distress.  I had her standing up and examine the veins in the left lower extremity she has got varicose veins running from the outer upper leg and on the outside of the thigh and going towards the left groin.  I do not feel any evidence of thrombophlebitis.    She has symptomatic left lower extremity varicose veins.  These are lifestyle limiting due to her active habits.  I have advised that she start wearing thigh-high compression stockings.  We will see her back in 3 months for clinic visit and we will perform venous duplex sonography of the left lower extremity  venous system.    Again, thank you for allowing me to participate in the care of your patient.        Sincerely,        Keith Cat MD

## 2020-09-04 ENCOUNTER — TELEPHONE (OUTPATIENT)
Dept: OTHER | Facility: CLINIC | Age: 35
End: 2020-09-04

## 2020-09-04 NOTE — TELEPHONE ENCOUNTER
Pt wants to purchase hose  1 pair thigh high  1 pair knee high    Knee high closed toe, athletic  Thigh high open toe, beige

## 2020-09-08 NOTE — TELEPHONE ENCOUNTER
Spoke with pt via phone regarding ordering compression hose. She stated she will call me back later today.    Spoke with pt to order compression hose.     Per pt request, faxed their compression hose Rx to [facility].   Pt would like 1 pair(s) of natural, open-toe, thigh high, 20-30mmhg compression hose.   Pt would like 1 pair(s) of CEP color ice gray, closed-toe, knee high, 20-30mmhg compression hose. Fax confirmed.  Swapnil Cavanaugh RN        Pt aware they will be shipped to their home address.    Swapnil Cavanaugh RN

## 2020-12-20 ENCOUNTER — HEALTH MAINTENANCE LETTER (OUTPATIENT)
Age: 35
End: 2020-12-20

## 2021-01-06 ENCOUNTER — NURSE TRIAGE (OUTPATIENT)
Dept: NURSING | Facility: CLINIC | Age: 36
End: 2021-01-06

## 2021-01-06 ENCOUNTER — TRANSFERRED RECORDS (OUTPATIENT)
Dept: HEALTH INFORMATION MANAGEMENT | Facility: CLINIC | Age: 36
End: 2021-01-06

## 2021-01-06 NOTE — TELEPHONE ENCOUNTER
RN triage   call from pt   Pt states she tested positive for covid on 12/29 --   Since Monday having racing heart and pressure on chest  Chest tightness and heaviness comes and goes -- lasts longer than 5 minutes  Breathing OK -- no diff breathing   Sometimes dizzy - not dizzy now   Feeling some chest tightness and heaviness now   Does not know how to check heart rate   Per protocol = co to ED   Pt will go to [facility] ED now     Vandana Bhatt RN  [facility]  Triage Nurse Advisor    COVID 19 Nurse Triage Plan/Patient Instructions    Please be aware that novel coronavirus (COVID-19) may be circulating in the community. If you develop symptoms such as fever, cough, or SOB or if you have concerns about the presence of another infection including coronavirus (COVID-19), please contact your health care provider or visit [email or web address].     Disposition/Instructions    ED Visit recommended. Follow protocol based instructions.     Bring Your Own Device:  Please also bring your smart device(s) (smart phones, tablets, laptops) and their charging cables for your personal use and to communicate with your care team during your visit.    Thank you for taking steps to prevent the spread of this virus.  o Limit your contact with others.  o Wear a simple mask to cover your cough.  o Wash your hands well and often.    Resources    [facility]: About COVID-19: [email or web address]    CDC: What to Do If You're Sick: www.cdc.gov/coronavirus/2019-ncov/about/steps-when-sick.html    CDC: Ending Home Isolation: www.cdc.gov/coronavirus/2019-ncov/hcp/disposition-in-home-patients.html     CDC: Caring for Someone: www.cdc.gov/coronavirus/2019-ncov/if-you-are-sick/care-for-someone.html     [facility]: Interim Guidance for Hospital Discharge to Home: www.health.[facility].[place]/diseases/coronavirus/hcp/hospdischarge.pdf    [facility] clinical trials (COVID-19 research studies): clinicalaffairs.[facility]/umn-clinical-trials     Below are the  COVID-19 hotlines at the [place] [facility] ([facility]). Interpreters are available.   o For health questions: Call 821-306-2180 or 1-459.198.9040 (7 a.m. to 7 p.m.)  o For questions about schools and childcare: Call 739-268-2833 or 1-805.963.4787 (7 a.m. to 7 p.m.)         Additional Information    Negative: Passed out (i.e., fainted, collapsed and was not responding)    Negative: Difficult to awaken or acting confused (e.g., disoriented, slurred speech)    Negative: Visible sweat on face or sweat dripping down face    Negative: Unable to walk, or can only walk with assistance (e.g., requires support)    Negative: Difficulty breathing    Negative: Dizziness, lightheadedness, or weakness    Chest pain    Negative: Pain also present in shoulder(s) or arm(s) or jaw    Negative: Difficulty breathing    Negative: History of prior 'blood clot' in leg or lungs (i.e., deep vein thrombosis, pulmonary embolism)    Negative: Hip or leg fracture in past 2 months (e.g, or had cast on leg or ankle)    Negative: Major surgery in the past month    Negative: Recent long-distance travel with prolonged time in car, bus, plane, or train (i.e., within past 2 weeks; 6 or more hours duration)    Chest pain lasting longer than 5 minutes    Intermittent chest pain and pain has been increasing in severity or frequency    Protocols used: HEART RATE AND HEARTBEAT [id number], CHEST PAIN-A-OH

## 2021-10-03 ENCOUNTER — HEALTH MAINTENANCE LETTER (OUTPATIENT)
Age: 36
End: 2021-10-03

## 2021-12-13 ASSESSMENT — ENCOUNTER SYMPTOMS
SHORTNESS OF BREATH: 0
CHILLS: 0
FREQUENCY: 0
HEARTBURN: 0
PARESTHESIAS: 0
BREAST MASS: 0
DYSURIA: 0
MYALGIAS: 0
HEADACHES: 1
NAUSEA: 0
ARTHRALGIAS: 0
WEAKNESS: 0
PALPITATIONS: 0
DIZZINESS: 0
COUGH: 0
JOINT SWELLING: 0
FEVER: 0
HEMATURIA: 0
DIARRHEA: 0
EYE PAIN: 1
CONSTIPATION: 0
SORE THROAT: 0
HEMATOCHEZIA: 0
NERVOUS/ANXIOUS: 0
ABDOMINAL PAIN: 0

## 2021-12-15 ENCOUNTER — OFFICE VISIT (OUTPATIENT)
Dept: FAMILY MEDICINE | Facility: CLINIC | Age: 36
End: 2021-12-15
Payer: COMMERCIAL

## 2021-12-15 VITALS
WEIGHT: 154 LBS | HEIGHT: 71 IN | DIASTOLIC BLOOD PRESSURE: 84 MMHG | RESPIRATION RATE: 16 BRPM | BODY MASS INDEX: 21.56 KG/M2 | TEMPERATURE: 97.6 F | OXYGEN SATURATION: 99 % | SYSTOLIC BLOOD PRESSURE: 122 MMHG | HEART RATE: 68 BPM

## 2021-12-15 DIAGNOSIS — Z23 HIGH PRIORITY FOR 2019-NCOV VACCINE: ICD-10-CM

## 2021-12-15 DIAGNOSIS — Z00.00 ROUTINE GENERAL MEDICAL EXAMINATION AT A HEALTH CARE FACILITY: Primary | ICD-10-CM

## 2021-12-15 DIAGNOSIS — F41.9 ANXIETY: ICD-10-CM

## 2021-12-15 PROCEDURE — 87624 HPV HI-RISK TYP POOLED RSLT: CPT | Performed by: NURSE PRACTITIONER

## 2021-12-15 PROCEDURE — 99213 OFFICE O/P EST LOW 20 MIN: CPT | Mod: 25 | Performed by: NURSE PRACTITIONER

## 2021-12-15 PROCEDURE — 91306 COVID-19,PF,MODERNA (18+ YRS BOOSTER .25ML): CPT | Performed by: NURSE PRACTITIONER

## 2021-12-15 PROCEDURE — 90686 IIV4 VACC NO PRSV 0.5 ML IM: CPT | Performed by: NURSE PRACTITIONER

## 2021-12-15 PROCEDURE — 0064A COVID-19,PF,MODERNA (18+ YRS BOOSTER .25ML): CPT | Performed by: NURSE PRACTITIONER

## 2021-12-15 PROCEDURE — 90471 IMMUNIZATION ADMIN: CPT | Performed by: NURSE PRACTITIONER

## 2021-12-15 PROCEDURE — G0145 SCR C/V CYTO,THINLAYER,RESCR: HCPCS | Performed by: NURSE PRACTITIONER

## 2021-12-15 PROCEDURE — 96127 BRIEF EMOTIONAL/BEHAV ASSMT: CPT | Performed by: NURSE PRACTITIONER

## 2021-12-15 PROCEDURE — 99395 PREV VISIT EST AGE 18-39: CPT | Mod: 25 | Performed by: NURSE PRACTITIONER

## 2021-12-15 RX ORDER — BUSPIRONE HYDROCHLORIDE 10 MG/1
TABLET ORAL
Qty: 405 TABLET | Refills: 1 | Status: SHIPPED | OUTPATIENT
Start: 2021-12-15 | End: 2022-06-20

## 2021-12-15 ASSESSMENT — ENCOUNTER SYMPTOMS
CONSTIPATION: 0
WEAKNESS: 0
FEVER: 0
EYE PAIN: 1
MYALGIAS: 0
PARESTHESIAS: 0
SORE THROAT: 0
HEMATURIA: 0
HEARTBURN: 0
CHILLS: 0
COUGH: 0
BREAST MASS: 0
PALPITATIONS: 0
HEADACHES: 1
DIARRHEA: 0
JOINT SWELLING: 0
SHORTNESS OF BREATH: 0
NAUSEA: 0
HEMATOCHEZIA: 0
DIZZINESS: 0
NERVOUS/ANXIOUS: 0
DYSURIA: 0
FREQUENCY: 0
ARTHRALGIAS: 0
ABDOMINAL PAIN: 0

## 2021-12-15 ASSESSMENT — ANXIETY QUESTIONNAIRES
1. FEELING NERVOUS, ANXIOUS, OR ON EDGE: MORE THAN HALF THE DAYS
3. WORRYING TOO MUCH ABOUT DIFFERENT THINGS: NOT AT ALL
5. BEING SO RESTLESS THAT IT IS HARD TO SIT STILL: NOT AT ALL
6. BECOMING EASILY ANNOYED OR IRRITABLE: SEVERAL DAYS
7. FEELING AFRAID AS IF SOMETHING AWFUL MIGHT HAPPEN: NOT AT ALL
IF YOU CHECKED OFF ANY PROBLEMS ON THIS QUESTIONNAIRE, HOW DIFFICULT HAVE THESE PROBLEMS MADE IT FOR YOU TO DO YOUR WORK, TAKE CARE OF THINGS AT HOME, OR GET ALONG WITH OTHER PEOPLE: SOMEWHAT DIFFICULT
2. NOT BEING ABLE TO STOP OR CONTROL WORRYING: NOT AT ALL
GAD7 TOTAL SCORE: 4

## 2021-12-15 ASSESSMENT — PATIENT HEALTH QUESTIONNAIRE - PHQ9
5. POOR APPETITE OR OVEREATING: SEVERAL DAYS
SUM OF ALL RESPONSES TO PHQ QUESTIONS 1-9: 2

## 2021-12-15 ASSESSMENT — MIFFLIN-ST. JEOR: SCORE: 1483.63

## 2021-12-15 NOTE — PROGRESS NOTES
SUBJECTIVE:   CC: Lindsay Burch is an 36 year old woman who presents for preventive health visit.       Healthy Habits:     Getting at least 3 servings of Calcium per day:  Yes    Bi-annual eye exam:  Yes    Dental care twice a year:  Yes    Sleep apnea or symptoms of sleep apnea:  None    Diet:  Regular (no restrictions)    Frequency of exercise:  4-5 days/week    Duration of exercise:  15-30 minutes    Taking medications regularly:  Yes    Medication side effects:  Other    PHQ-2 Total Score: 0    Additional concerns today:  Yes  She is having a bit more anxiety, irritability and moodiness the week before her period.  Otherwise, she feels that her anxiety is well-controlled on the increased dose of Buspar.                Today's PHQ-2 Score:   PHQ-2 ( 1999 Pfizer) 12/13/2021   Q1: Little interest or pleasure in doing things 0   Q2: Feeling down, depressed or hopeless 0   PHQ-2 Score 0   PHQ-2 Total Score (12-17 Years)- Positive if 3 or more points; Administer PHQ-A if positive -   Q1: Little interest or pleasure in doing things Not at all   Q2: Feeling down, depressed or hopeless Not at all   PHQ-2 Score 0       Abuse: Current or Past (Physical, Sexual or Emotional) - No  Do you feel safe in your environment? Yes    Have you ever done Advance Care Planning? (For example, a Health Directive, POLST, or a discussion with a medical provider or your loved ones about your wishes): No, advance care planning information given to patient to review.  Patient plans to discuss their wishes with loved ones or provider.      Social History     Tobacco Use     Smoking status: Never Smoker     Smokeless tobacco: Never Used   Substance Use Topics     Alcohol use: Yes     Alcohol/week: 0.0 standard drinks     Comment: occasional     If you drink alcohol do you typically have >3 drinks per day or >7 drinks per week? No    Alcohol Use 12/15/2021   Prescreen: >3 drinks/day or >7 drinks/week? -   Prescreen: >3 drinks/day or >7  "drinks/week? No   No flowsheet data found.    Reviewed orders with patient.  Reviewed health maintenance and updated orders accordingly - Yes      Breast Cancer Screening:    Breast CA Risk Assessment (FHS-7) 12/13/2021   Do you have a family history of breast, colon, or ovarian cancer? No / Unknown           Pertinent mammograms are reviewed under the imaging tab.    History of abnormal Pap smear: NO - age 30-65 PAP every 5 years with negative HPV co-testing recommended     Reviewed and updated as needed this visit by clinical staff  Tobacco  Allergies  Meds   Med Hx  Surg Hx  Fam Hx  Soc Hx       Reviewed and updated as needed this visit by Provider                   Review of Systems   Constitutional: Negative for chills and fever.   HENT: Negative for congestion, ear pain, hearing loss and sore throat.    Eyes: Positive for pain. Negative for visual disturbance.   Respiratory: Negative for cough and shortness of breath.    Cardiovascular: Negative for chest pain, palpitations and peripheral edema.   Gastrointestinal: Negative for abdominal pain, constipation, diarrhea, heartburn, hematochezia and nausea.   Breasts:  Negative for tenderness, breast mass and discharge.   Genitourinary: Negative for dysuria, frequency, genital sores, hematuria, pelvic pain, urgency, vaginal bleeding and vaginal discharge.   Musculoskeletal: Negative for arthralgias, joint swelling and myalgias.   Skin: Negative for rash.   Neurological: Positive for headaches. Negative for dizziness, weakness and paresthesias.   Psychiatric/Behavioral: Positive for mood changes. The patient is not nervous/anxious.           OBJECTIVE:   /84 (BP Location: Left arm, Patient Position: Sitting, Cuff Size: Adult Regular)   Pulse 68   Temp 97.6  F (36.4  C) (Temporal)   Resp 16   Ht 1.81 m (5' 11.25\")   Wt 69.9 kg (154 lb)   LMP 11/26/2021 (Exact Date)   SpO2 99%   BMI 21.33 kg/m    Physical Exam  GENERAL: healthy, alert and no " "distress  EYES: Eyes grossly normal to inspection, PERRL and conjunctivae and sclerae normal  HENT: ear canals and TM's normal, nose and mouth without ulcers or lesions  NECK: no adenopathy, no asymmetry, masses, or scars and thyroid normal to palpation  RESP: lungs clear to auscultation - no rales, rhonchi or wheezes  BREAST: normal without masses, tenderness or nipple discharge and no palpable axillary masses or adenopathy  CV: regular rate and rhythm, normal S1 S2, no S3 or S4, no murmur, click or rub, no peripheral edema and peripheral pulses strong  ABDOMEN: soft, nontender, no hepatosplenomegaly, no masses and bowel sounds normal   (female): normal female external genitalia, normal urethral meatus, vaginal mucosa pink, moist, well rugated, and normal cervix/adnexa/uterus without masses or discharge  MS: no gross musculoskeletal defects noted, no edema  SKIN: no suspicious lesions or rashes  NEURO: Normal strength and tone, mentation intact and speech normal  PSYCH: mentation appears normal, affect normal/bright        ASSESSMENT/PLAN:   (Z00.00) Routine general medical examination at a health care facility  (primary encounter diagnosis)  Comment:   Plan: PAP screen with HPV - recommended age 30 - 65         years            (F41.9) Anxiety  Comment: well-controlled  Plan: busPIRone (BUSPAR) 10 MG tablet        Will increase up to 15 mg TID the week before her period if needed.    (Z23) High priority for 2019-nCoV vaccine  Comment:   Plan: COVID-19,PF,MODERNA (18+ Yrs BOOSTER .25mL)              Patient has been advised of split billing requirements and indicates understanding:   COUNSELING:  Reviewed preventive health counseling, as reflected in patient instructions    Estimated body mass index is 21.33 kg/m  as calculated from the following:    Height as of this encounter: 1.81 m (5' 11.25\").    Weight as of this encounter: 69.9 kg (154 lb).        She reports that she has never smoked. She has never used " smokeless tobacco.      Counseling Resources:  ATP IV Guidelines  Pooled Cohorts Equation Calculator  Breast Cancer Risk Calculator  BRCA-Related Cancer Risk Assessment: FHS-7 Tool  FRAX Risk Assessment  ICSI Preventive Guidelines  Dietary Guidelines for Americans, 2010  USDA's MyPlate  ASA Prophylaxis  Lung CA Screening    Shruti Lim NP  [facility]

## 2021-12-16 ASSESSMENT — ANXIETY QUESTIONNAIRES: GAD7 TOTAL SCORE: 4

## 2021-12-17 LAB
BKR LAB AP GYN ADEQUACY: NORMAL
BKR LAB AP GYN INTERPRETATION: NORMAL
BKR LAB AP HPV REFLEX: NORMAL
BKR LAB AP LMP: NORMAL
BKR LAB AP PREVIOUS ABNORMAL: NORMAL
PATH REPORT.COMMENTS IMP SPEC: NORMAL
PATH REPORT.COMMENTS IMP SPEC: NORMAL
PATH REPORT.RELEVANT HX SPEC: NORMAL

## 2021-12-21 LAB
HUMAN PAPILLOMA VIRUS 16 DNA: NEGATIVE
HUMAN PAPILLOMA VIRUS 18 DNA: NEGATIVE
HUMAN PAPILLOMA VIRUS FINAL DIAGNOSIS: NORMAL
HUMAN PAPILLOMA VIRUS OTHER HR: NEGATIVE

## 2022-01-07 ENCOUNTER — OFFICE VISIT (OUTPATIENT)
Dept: FAMILY MEDICINE | Facility: CLINIC | Age: 37
End: 2022-01-07
Payer: COMMERCIAL

## 2022-01-07 VITALS
WEIGHT: 155 LBS | BODY MASS INDEX: 21.47 KG/M2 | HEART RATE: 75 BPM | SYSTOLIC BLOOD PRESSURE: 121 MMHG | OXYGEN SATURATION: 100 % | TEMPERATURE: 98.1 F | DIASTOLIC BLOOD PRESSURE: 82 MMHG

## 2022-01-07 DIAGNOSIS — J02.9 ACUTE PHARYNGITIS, UNSPECIFIED ETIOLOGY: ICD-10-CM

## 2022-01-07 DIAGNOSIS — J02.0 STREPTOCOCCAL SORE THROAT: Primary | ICD-10-CM

## 2022-01-07 LAB
DEPRECATED S PYO AG THROAT QL EIA: NEGATIVE
GROUP A STREP BY PCR: NOT DETECTED

## 2022-01-07 PROCEDURE — U0005 INFEC AGEN DETEC AMPLI PROBE: HCPCS | Performed by: NURSE PRACTITIONER

## 2022-01-07 PROCEDURE — U0003 INFECTIOUS AGENT DETECTION BY NUCLEIC ACID (DNA OR RNA); SEVERE ACUTE RESPIRATORY SYNDROME CORONAVIRUS 2 (SARS-COV-2) (CORONAVIRUS DISEASE [COVID-19]), AMPLIFIED PROBE TECHNIQUE, MAKING USE OF HIGH THROUGHPUT TECHNOLOGIES AS DESCRIBED BY CMS-2020-01-R: HCPCS | Performed by: NURSE PRACTITIONER

## 2022-01-07 PROCEDURE — 87651 STREP A DNA AMP PROBE: CPT | Performed by: NURSE PRACTITIONER

## 2022-01-07 PROCEDURE — 99213 OFFICE O/P EST LOW 20 MIN: CPT | Performed by: NURSE PRACTITIONER

## 2022-01-07 NOTE — PATIENT INSTRUCTIONS
Patient Education     Self-Care for Sore Throats     Sore throats happen for many reasons, such as colds, allergies, cigarette smoke, air pollution, and infections caused by viruses or bacteria. In any case, your throat becomes red and sore. Your goal for self-care is to reduce your discomfort while giving your throat a chance to heal.  Moisten and soothe your throat  Tips include the following:    Try a sip of water first thing after waking up.    Keep your throat moist by drinking 6 or more glasses of clear liquids every day.    Run a cool-air humidifier in your room overnight.    Stay away from cigarette smoke.     Check the air quality index,if air pollution gives you a sore throat. On high pollution days, try to limit outdoor time.    Suck on throat lozenges, cough drops, hard candy, ice chips, or frozen fruit-juice bars. Use the sugar-free versions if your diet or medical condition requires them.  Gargle to ease irritation  Gargling every hour or 2 can ease irritation. Try gargling with 1 of these solutions:    1/4 teaspoon of salt in 1/2 cup of warm water    An over-the-counter anesthetic gargle  Use medicine for more relief  Over-the-counter medicine can reduce sore throat symptoms. Ask your pharmacist if you have questions about which medicine to use. To prevent possible medicine interactions, let the pharmacist know what medicines you take. To decrease symptoms:    Ease pain with anesthetic sprays. Aspirin or an aspirin substitute also helps. Remember, never give aspirin to anyone 18 or younger. Don't take aspirin if you are already taking blood thinners.     For sore throats caused by allergies, try antihistamines to block the allergic reaction.  Unless a sore throat is caused by a bacterial infection, antibiotics won t help you.  Prevent future sore throats  Prevention tips include:    Stop smoking or reduce contact with secondhand smoke. Smoke irritates the tender throat lining.    Limit contact with  pets and with allergy-causing substances, such as pollen and mold.    Wash your hands often when you re around someone with a sore throat or cold. This will keep viruses or bacteria from spreading.    Limit outdoor time when air pollution is bad.    Don t strain your vocal cords.  When to call your healthcare provider  Contact your healthcare provider if you have:    Fever of 100.4 F (38.0 C) or higher, or as directed by your healthcare provider    White spots on the throat    Great Trouble swallowing    A skin rash    Recent exposure to someone else with strep bacteria    Severe hoarseness and swollen glands in the neck or jaw  Call 911  Call 911 if any of the following occur:    Trouble breathing or catching your breath    Drooling and problems swallowing    Wheezing    Unable to talk    Feeling dizzy or faint    Feeling of doom  Glory last reviewed this educational content on 9/1/2019 2000-2021 [employer]. All rights reserved. This information is not intended as a substitute for professional medical care. Always follow your healthcare professional's instructions.

## 2022-01-07 NOTE — PROGRESS NOTES
Assessment & Plan     # Acute pharyngitis  Centor criteria score 1; Negative strep;+ covid exposure  - Gargling every hour or 2h w/ 1/4 teaspoon of salt in 1/2 cup of warm water  - cepacol lozenges OTC use as directed  - tylenol OTC as directed for pain  - ibuprofen/naproxen OTC as directed for inflammation  - Streptococcus A Rapid Screen w/Reflex to PCR - Clinic Collect  - Group A Streptococcus PCR Throat Swab  - Symptomatic; Yes; 1/4/2022 COVID-19 Virus (Coronavirus) by PCR      FUTURE APPOINTMENTS:       - Follow-up for annual visit or as needed      RADHA Nair CNP  [facility]    Curtis Montoya is a 36 year old who presents for the following health issues c/o sore throat. Patient complains of sore throat which started ~3 days ago pain w/swallowing, redness, swelling, + dry nonproductive cough, +PND, rhinorrhea clear, + fatigue; no shortness of breath, chest pain/pressure/tightness, no wheezing; was taking tylenol for pain no dose today. Patient states a high risk covid exposure in sons school 1 week ago on 12/29 with 4 positive cases; she and her family has taken home antigen test which were all negative. Son has return to school today. Lindsay is fully vaccinated plus booster Moderna.    HPI     Acute Illness  Acute illness concerns: sore throat  Onset/Duration: 3 days ago  Symptoms:  Fever: no  Chills/Sweats: no  Headache (location?): no  Sinus Pressure: no  Conjunctivitis:  no  Ear Pain: no  Rhinorrhea: YES  Congestion: no  Sore Throat: no  Cough: YES-non-productive  Wheeze: no  Decreased Appetite: no  Nausea: no  Vomiting: no  Diarrhea: no  Dysuria/Freq.: no  Dysuria or Hematuria: no  Fatigue/Achiness: YES  Sick/Strep Exposure: no  Therapies tried and outcome: cough drops      Review of Systems   CONSTITUTIONAL:NEGATIVE for fever, chills, change in weight, fatigue and malaise  ENT/MOUTH: NEGATIVE for ear, mouth and throat problems  RESP:NEGATIVE for significant  cough or SOB and cough-non productive      Objective    /82   Pulse 75   Temp 98.1  F (36.7  C) (Temporal)   Wt 70.3 kg (155 lb)   SpO2 100%   BMI 21.47 kg/m    Body mass index is 21.47 kg/m .     Physical Exam   GENERAL: healthy, alert and no distress  NECK: bilateral anterior cervical adenopathy, no asymmetry, masses, or scars and thyroid normal to palpation; erythematous oropharynx w/o exudate; TMs intact w/o erythema or bulging;    RESP: lungs clear to auscultation - no rales, rhonchi or wheezes  MS: no gross musculoskeletal defects noted, no edema    Results for orders placed or performed in visit on 01/07/22   Streptococcus A Rapid Screen w/Reflex to PCR - Clinic Collect     Status: Normal    Specimen: Throat; Swab   Result Value Ref Range    Group A Strep antigen Negative Negative

## 2022-01-08 LAB — SARS-COV-2 RNA RESP QL NAA+PROBE: NEGATIVE

## 2022-03-24 ENCOUNTER — TRANSFERRED RECORDS (OUTPATIENT)
Dept: HEALTH INFORMATION MANAGEMENT | Facility: CLINIC | Age: 37
End: 2022-03-24
Payer: COMMERCIAL

## 2022-04-26 ENCOUNTER — LAB (OUTPATIENT)
Dept: LAB | Facility: CLINIC | Age: 37
End: 2022-04-26
Payer: COMMERCIAL

## 2022-04-26 DIAGNOSIS — H04.123 DRY EYE SYNDROME OF BILATERAL LACRIMAL GLANDS: Primary | ICD-10-CM

## 2022-04-26 PROCEDURE — 99000 SPECIMEN HANDLING OFFICE-LAB: CPT

## 2022-04-26 PROCEDURE — 84445 ASSAY OF TSI GLOBULIN: CPT | Mod: 90

## 2022-04-26 PROCEDURE — 36415 COLL VENOUS BLD VENIPUNCTURE: CPT

## 2022-04-29 LAB — TSI SER-ACNC: <1 TSI INDEX

## 2022-07-25 ENCOUNTER — MYC MEDICAL ADVICE (OUTPATIENT)
Dept: FAMILY MEDICINE | Facility: CLINIC | Age: 37
End: 2022-07-25

## 2022-07-25 ENCOUNTER — NURSE TRIAGE (OUTPATIENT)
Dept: NURSING | Facility: CLINIC | Age: 37
End: 2022-07-25

## 2022-07-26 NOTE — TELEPHONE ENCOUNTER
"Pt calling with concerns of her neck is swollen, when bending neck down, can feel something there. Feels off, not normal, does not hurt. Can still swallow. Have to do hard swallow, denies difficulty breathing  Noticed the lump there yesterday, and seem to be bigger now, middle of neck  Does not see a little bump or nodule there, just swollen  History of Hypothyroidism      Triage disposition to be seen within 24 hours, pt verbalized understanding. Pt sent msg via [employer] and msg to Endocrinologist. Transferred to scheduling for appointment. Call back if have more questions or if symptoms worsen.      Jorge Diallo, RN, BSN  7/25/2022 at 9:50 PM  [facility]        Reason for Disposition    Lymph node in the neck is swollen or painful to the touch    Rapid increase in size of node over several hours    Additional Information    Negative: Shock suspected (e.g., cold/pale/clammy skin, too weak to stand, low BP, rapid pulse)    Negative: Difficult to awaken or acting confused (e.g., disoriented, slurred speech)    Negative: Sounds like a life-threatening emergency to the triager    Negative: [1] Similar pain previously AND [2] it was from \"angina\" AND [3] not relieved by nitroglycerin    Negative: [1] Similar pain previously AND [2] it was from \"heart attack\"    Negative: Followed a neck injury (e.g., MVA, sports, impact or collision)    Negative: Chest pain    Negative: Severe difficulty breathing (e.g., struggling for each breath, speaks in single words)    Negative: [1] Node is in the neck AND [2] causes difficulty breathing    Negative: [1] Node is in the neck AND [2] can't swallow fluids    Negative: Fever > 103 F (39.4 C)    Negative: [1] Lump or swelling in groin AND [2] pulsating (like heartbeat)    Negative: Patient sounds very sick or weak to the triager    Negative: [1] Single large node AND [2] size > 1 inch (2.5 cm) AND [3] fever    Negative: [1] Overlying skin is red AND [2] fever    Negative: " [1] Single large node AND [2] size > 1 inch (2.5 cm) AND [3] no fever    Protocols used: NECK PAIN OR [id number], LYMPH NODES - [id number]

## 2022-07-27 ENCOUNTER — HOSPITAL ENCOUNTER (OUTPATIENT)
Dept: ULTRASOUND IMAGING | Facility: CLINIC | Age: 37
Discharge: HOME | End: 2022-07-27
Attending: INTERNAL MEDICINE | Admitting: INTERNAL MEDICINE
Payer: COMMERCIAL

## 2022-07-27 DIAGNOSIS — E03.9 HYPOTHYROIDISM: ICD-10-CM

## 2022-07-27 PROCEDURE — 76536 US EXAM OF HEAD AND NECK: CPT

## 2022-07-28 NOTE — TELEPHONE ENCOUNTER
Writer responded via [employer].    MICHELLE LandrumN, RN  [facility]

## 2022-08-01 NOTE — TELEPHONE ENCOUNTER
Writer responded via [employer].    MICHELLE LandrumN, RN  [facility]

## 2022-08-25 ENCOUNTER — TRANSFERRED RECORDS (OUTPATIENT)
Dept: FAMILY MEDICINE | Facility: CLINIC | Age: 37
End: 2022-08-25

## 2022-09-10 ENCOUNTER — HEALTH MAINTENANCE LETTER (OUTPATIENT)
Age: 37
End: 2022-09-10

## 2022-12-10 DIAGNOSIS — F41.9 ANXIETY: ICD-10-CM

## 2022-12-13 RX ORDER — BUSPIRONE HYDROCHLORIDE 10 MG/1
TABLET ORAL
Qty: 405 TABLET | Refills: 0 | Status: SHIPPED | OUTPATIENT
Start: 2022-12-13 | End: 2023-02-08

## 2022-12-13 NOTE — TELEPHONE ENCOUNTER
Medication is being filled for 1 time refill only due to:  Patient needs to be seen because it has been more than one year since last visit.     Team Coordinators: Please contact patient to set up annual physical for any further refills.     ROSA Clinton RN  [facility]

## 2023-01-03 ENCOUNTER — NURSE TRIAGE (OUTPATIENT)
Dept: NURSING | Facility: CLINIC | Age: 38
End: 2023-01-03

## 2023-01-03 ENCOUNTER — OFFICE VISIT (OUTPATIENT)
Dept: INTERNAL MEDICINE | Facility: CLINIC | Age: 38
End: 2023-01-03
Payer: COMMERCIAL

## 2023-01-03 VITALS
SYSTOLIC BLOOD PRESSURE: 110 MMHG | DIASTOLIC BLOOD PRESSURE: 64 MMHG | HEART RATE: 74 BPM | TEMPERATURE: 97.8 F | BODY MASS INDEX: 22.4 KG/M2 | OXYGEN SATURATION: 100 % | HEIGHT: 71 IN | WEIGHT: 160 LBS

## 2023-01-03 DIAGNOSIS — G44.319 ACUTE POST-TRAUMATIC HEADACHE, NOT INTRACTABLE: ICD-10-CM

## 2023-01-03 DIAGNOSIS — S06.0X0A CONCUSSION WITHOUT LOSS OF CONSCIOUSNESS, INITIAL ENCOUNTER: ICD-10-CM

## 2023-01-03 DIAGNOSIS — R11.0 NAUSEA: ICD-10-CM

## 2023-01-03 DIAGNOSIS — S09.90XA INJURY OF HEAD, INITIAL ENCOUNTER: Primary | ICD-10-CM

## 2023-01-03 PROCEDURE — 99214 OFFICE O/P EST MOD 30 MIN: CPT | Performed by: INTERNAL MEDICINE

## 2023-01-03 RX ORDER — LIOTHYRONINE SODIUM 5 UG/1
1 TABLET ORAL EVERY EVENING
COMMUNITY
Start: 2020-01-01

## 2023-01-03 RX ORDER — LIFITEGRAST 50 MG/ML
SOLUTION/ DROPS OPHTHALMIC
COMMUNITY
Start: 2022-11-15

## 2023-01-03 ASSESSMENT — PATIENT HEALTH QUESTIONNAIRE - PHQ9
SUM OF ALL RESPONSES TO PHQ QUESTIONS 1-9: 2
10. IF YOU CHECKED OFF ANY PROBLEMS, HOW DIFFICULT HAVE THESE PROBLEMS MADE IT FOR YOU TO DO YOUR WORK, TAKE CARE OF THINGS AT HOME, OR GET ALONG WITH OTHER PEOPLE: NOT DIFFICULT AT ALL
SUM OF ALL RESPONSES TO PHQ QUESTIONS 1-9: 2

## 2023-01-03 NOTE — TELEPHONE ENCOUNTER
Pt calling with a head injury.     Pt says she was snow boarding on Friday of last week and her board caught the edge of the chair lift. She then fell and hit the back of her head on the ground. She did not lose consciousness, but was quite dizzy when she got up. She was wearing a helmet.    Lindsay says ever since the injury, her head has been very sore. The lower occipital area of her skull is where it hurts most, she says. She also c/o ringing in her ears, nausea (no vomiting), and a headache every morning. She says she takes Advil, but it just takes the head pain to a dull ache.     Protocol recommends pt be evaluated in office today or tomorrow. Care advice and callback instructions reviewed and pt transferred to [facility].    Liza Nye RN, BSN  [facility]   Triage Nurse Advisor        Reason for Disposition    After 3 days and headache persists    Additional Information    Negative: ACUTE NEUROLOGIC SYMPTOM and symptom present now    Negative: Knocked out (unconscious) > 1 minute    Negative: Seizure (convulsion) occurred  (Exception: Prior history of seizures and now alert and without Acute Neurologic Symptoms.)    Negative: Neck pain after dangerous injury (e.g., MVA, diving, trampoline, contact sports, fall > 10 feet or 3 meters)  (Exception: Neck pain began > 1 hour after injury.)    Negative: Major bleeding (actively dripping or spurting) that can't be stopped    Negative: Penetrating head injury (e.g., knife, gunshot wound, metal object)    Negative: Sounds like a life-threatening emergency to the triager    Negative: Diagnosed with a concussion within last 14 days    Negative: Can't remember what happened (amnesia)    Negative: Vomiting once or more    Negative: Watery or blood-tinged fluid dripping from the nose or ears    Negative: Knocked out (unconscious) < 1 minute and now fine    Negative: ACUTE NEUROLOGIC SYMPTOM and now fine    Negative: Severe headache    Negative: Dangerous injury  (e.g., MVA, diving, trampoline, contact sports, fall > 10 feet or 3 meters) or severe blow from hard object (e.g., golf club or baseball bat)    Negative: Large swelling or bruise (> 2 inches or 5 cm)    Negative: Skin is split open or gaping (length > 1/2 inch or 12 mm)    Negative: Bleeding won't stop after 10 minutes of direct pressure (using correct technique)    Negative: One or two 'black eyes' (bruising, purple color of eyelids), and onset within 24 hours of head injury    Negative: Taking Coumadin (warfarin) or other strong blood thinner, or known bleeding disorder (e.g., thrombocytopenia)    Negative: Age over 65 years with an area of head swelling or bruise    Negative: Sounds like a serious injury to the triager    Negative: Patient is confused or is an unreliable provider of information (e.g., dementia, severe intellectual disability, alcohol intoxication)    Negative: No prior tetanus shots (or is not fully vaccinated) and any wound (e.g., cut or scrape)    Negative: HIV positive or severe immunodeficiency (severely weak immune system) and DIRTY cut or scrape    Negative: Patient wants to be seen    Negative: Last tetanus shot > 5 years ago and DIRTY cut or scrape    Negative: Last tetanus shot > 10 years ago and CLEAN cut or scrape    Protocols used: HEAD INJURY-A-OH

## 2023-01-03 NOTE — PROGRESS NOTES
Assessment & Plan     1. Injury of head, initial encounter  Given her symptoms I suspect she has a mild concussion.  Her symptoms are not improving and they are actually worsening a little bit.  I think that to be on the safe side we should image her with CT scan.  I gave her some patient information to read regarding concussion and brain injury.  If things are worsening I would recommend evaluation by neurology in the concussion clinic  - CT Head w/o Contrast; Future    2. Acute post-traumatic headache, not intractable  As above  - CT Head w/o Contrast; Future    3. Nausea  As above.  She is able to eat and drink without difficulty  - CT Head w/o Contrast; Future    4. Concussion without loss of consciousness, initial encounter  As above.  - CT Head w/o Contrast; Future                 No follow-ups on file.    JESUSITA RUCKER MD  [facility]   Lindsay is a 37 year old, presenting for the following health issues:  Fall (Took at fall on 12/30/22, current sxs's: c/o ringing in her ears, nausea (no vomiting), and  headache/head pains./)      History of Present Illness       Reason for visit:  Fall on friday snowboarding and hit head hard  Symptom onset:  3-7 days ago  Symptoms include:  Back of head is sore Headache Nausea Dizzy Ring in ears Neck Pain  Symptom intensity:  Moderate  Symptom progression:  Worsening  Had these symptoms before:  No  What makes it worse:  Bright lights  What makes it better:  Advil helps    She eats 4 or more servings of fruits and vegetables daily.She consumes 0 sweetened beverage(s) daily.She exercises with enough effort to increase her heart rate 20 to 29 minutes per day.  She exercises with enough effort to increase her heart rate 5 days per week.   She is taking medications regularly.    Today's PHQ-9         PHQ-9 Total Score: 2    PHQ-9 Q9 Thoughts of better off dead/self-harm past 2 weeks :   Not at all    How difficult have these  "problems made it for you to do your work, take care of things at home, or get along with other people: Not difficult at all         Nice young woman who was snowboarding this past Friday with a helmet which was about 5 days ago.  She was trying to get off the chairlift and fell backwards and hit her head.  No loss of consciousness that she is aware of.  She has not been feeling well since then.  She has a occipital headache.  She has pain in her eyes.  She notes maybe some mild vision disturbance.  That was initially but now she is doing better.  Headache seems to be getting worse.  There is some mild nausea but no vomiting.  She is more tired than usual.  She has tinnitus which is not resolving    Review of Systems         Objective    /64 (BP Location: Left arm, Patient Position: Sitting, Cuff Size: Adult Regular)   Pulse 74   Temp 97.8  F (36.6  C)   Ht 1.81 m (5' 11.25\")   Wt 72.6 kg (160 lb)   SpO2 100%   BMI 22.16 kg/m    Body mass index is 22.16 kg/m .  Physical Exam   Delightful woman with no focal cranial nerve deficits or confusion                    "

## 2023-01-21 ENCOUNTER — HEALTH MAINTENANCE LETTER (OUTPATIENT)
Age: 38
End: 2023-01-21

## 2023-02-07 ASSESSMENT — ENCOUNTER SYMPTOMS: BREAST MASS: 0

## 2023-02-08 ENCOUNTER — OFFICE VISIT (OUTPATIENT)
Dept: FAMILY MEDICINE | Facility: CLINIC | Age: 38
End: 2023-02-08
Payer: COMMERCIAL

## 2023-02-08 VITALS
DIASTOLIC BLOOD PRESSURE: 76 MMHG | BODY MASS INDEX: 22.12 KG/M2 | TEMPERATURE: 97.1 F | OXYGEN SATURATION: 100 % | SYSTOLIC BLOOD PRESSURE: 122 MMHG | HEART RATE: 83 BPM | RESPIRATION RATE: 15 BRPM | HEIGHT: 71 IN | WEIGHT: 158 LBS

## 2023-02-08 DIAGNOSIS — F41.9 ANXIETY: ICD-10-CM

## 2023-02-08 DIAGNOSIS — Z00.00 ROUTINE GENERAL MEDICAL EXAMINATION AT A HEALTH CARE FACILITY: Primary | ICD-10-CM

## 2023-02-08 PROCEDURE — 99395 PREV VISIT EST AGE 18-39: CPT | Mod: 25 | Performed by: NURSE PRACTITIONER

## 2023-02-08 PROCEDURE — 99213 OFFICE O/P EST LOW 20 MIN: CPT | Mod: 25 | Performed by: NURSE PRACTITIONER

## 2023-02-08 RX ORDER — BUSPIRONE HYDROCHLORIDE 10 MG/1
10 TABLET ORAL 2 TIMES DAILY
Qty: 180 TABLET | Refills: 3 | Status: SHIPPED | OUTPATIENT
Start: 2023-02-08 | End: 2023-02-13

## 2023-02-08 ASSESSMENT — ENCOUNTER SYMPTOMS: BREAST MASS: 0

## 2023-02-08 ASSESSMENT — ANXIETY QUESTIONNAIRES
6. BECOMING EASILY ANNOYED OR IRRITABLE: NOT AT ALL
4. TROUBLE RELAXING: SEVERAL DAYS
1. FEELING NERVOUS, ANXIOUS, OR ON EDGE: NOT AT ALL
5. BEING SO RESTLESS THAT IT IS HARD TO SIT STILL: NOT AT ALL
8. IF YOU CHECKED OFF ANY PROBLEMS, HOW DIFFICULT HAVE THESE MADE IT FOR YOU TO DO YOUR WORK, TAKE CARE OF THINGS AT HOME, OR GET ALONG WITH OTHER PEOPLE?: NOT DIFFICULT AT ALL
3. WORRYING TOO MUCH ABOUT DIFFERENT THINGS: NOT AT ALL
GAD7 TOTAL SCORE: 1
IF YOU CHECKED OFF ANY PROBLEMS ON THIS QUESTIONNAIRE, HOW DIFFICULT HAVE THESE PROBLEMS MADE IT FOR YOU TO DO YOUR WORK, TAKE CARE OF THINGS AT HOME, OR GET ALONG WITH OTHER PEOPLE: NOT DIFFICULT AT ALL
2. NOT BEING ABLE TO STOP OR CONTROL WORRYING: NOT AT ALL
7. FEELING AFRAID AS IF SOMETHING AWFUL MIGHT HAPPEN: NOT AT ALL
GAD7 TOTAL SCORE: 1
7. FEELING AFRAID AS IF SOMETHING AWFUL MIGHT HAPPEN: NOT AT ALL

## 2023-02-08 ASSESSMENT — PAIN SCALES - GENERAL: PAINLEVEL: NO PAIN (0)

## 2023-02-08 NOTE — PATIENT INSTRUCTIONS
Decrease Buspar to 1/2 tablet twice daily 2-4 weeks, then decrease to 1/2 tablet once daily for 2-4 weeks, then stop.      Preventive Health Recommendations  Female Ages 26 - 39  Yearly exam:   See your health care provider every year in order to  Review health changes.   Discuss preventive care.    Review your medicines if you your doctor has prescribed any.    Until age 30: Get a Pap test every three years (more often if you have had an abnormal result).    After age 30: Talk to your doctor about whether you should have a Pap test every 3 years or have a Pap test with HPV screening every 5 years.   You do not need a Pap test if your uterus was removed (hysterectomy) and you have not had cancer.  You should be tested each year for STDs (sexually transmitted diseases), if you're at risk.   Talk to your provider about how often to have your cholesterol checked.  If you are at risk for diabetes, you should have a diabetes test (fasting glucose).  Shots: Get a flu shot each year. Get a tetanus shot every 10 years.   Nutrition:   Eat at least 5 servings of fruits and vegetables each day.  Eat whole-grain bread, whole-wheat pasta and brown rice instead of white grains and rice.  Get adequate Calcium and Vitamin D.     Lifestyle  Exercise at least 150 minutes a week (30 minutes a day, 5 days of the week). This will help you control your weight and prevent disease.  Limit alcohol to one drink per day.  No smoking.   Wear sunscreen to prevent skin cancer.  See your dentist every six months for an exam and cleaning.

## 2023-02-08 NOTE — PROGRESS NOTES
SUBJECTIVE:   CC: Lindsay is an 37 year old who presents for preventive health visit.   Patient has been advised of split billing requirements and indicates understanding: Yes  Healthy Habits:     Getting at least 3 servings of Calcium per day:  Yes    Bi-annual eye exam:  Yes    Dental care twice a year:  Yes    Sleep apnea or symptoms of sleep apnea:  None    Diet:  Gluten-free/reduced    Frequency of exercise:  4-5 days/week    Duration of exercise:  30-45 minutes    Taking medications regularly:  Yes    Medication side effects:  None    PHQ-2 Total Score: 0    Additional concerns today:  No    Overall, her anxiety is stable.  She is doing well on her current dose of Buspar.  She is wondering about trying to get off it.      Today's PHQ-2 Score:   PHQ-2 ( 1999 Pfizer) 2/7/2023   Q1: Little interest or pleasure in doing things 0   Q2: Feeling down, depressed or hopeless 0   PHQ-2 Score 0   PHQ-2 Total Score (12-17 Years)- Positive if 3 or more points; Administer PHQ-A if positive -   Q1: Little interest or pleasure in doing things Not at all   Q2: Feeling down, depressed or hopeless Not at all   PHQ-2 Score 0           Social History     Tobacco Use     Smoking status: Never     Smokeless tobacco: Never   Substance Use Topics     Alcohol use: Yes     Alcohol/week: 0.0 standard drinks     Comment: occasional     If you drink alcohol do you typically have >3 drinks per day or >7 drinks per week? No    No flowsheet data found.    Reviewed orders with patient.  Reviewed health maintenance and updated orders accordingly - Yes      Breast Cancer Screening:    Breast CA Risk Assessment (FHS-7) 12/13/2021   Do you have a family history of breast, colon, or ovarian cancer? No / Unknown         Pertinent mammograms are reviewed under the imaging tab.    History of abnormal Pap smear: NO - age 30-65 PAP every 5 years with negative HPV co-testing recommended  PAP / HPV Latest Ref Rng & Units 12/15/2021   PAP   Negative for  "Intraepithelial Lesion or Malignancy (NILM)   HPV16 Negative Negative   HPV18 Negative Negative   HRHPV Negative Negative     Reviewed and updated as needed this visit by clinical staff   Tobacco  Allergies  Meds  Problems  Med Hx  Surg Hx  Fam Hx          Reviewed and updated as needed this visit by Provider   Tobacco  Allergies  Meds  Problems  Med Hx  Surg Hx  Fam Hx             Review of Systems   Breasts:  Negative for tenderness, breast mass and discharge.   Genitourinary: Negative for pelvic pain, vaginal bleeding and vaginal discharge.          OBJECTIVE:   /76 (BP Location: Right arm, Patient Position: Sitting, Cuff Size: Adult Regular)   Pulse 83   Temp 97.1  F (36.2  C) (Temporal)   Resp 15   Ht 1.803 m (5' 11\")   Wt 71.7 kg (158 lb)   LMP 01/18/2023 (Approximate)   SpO2 100%   BMI 22.04 kg/m    Physical Exam  GENERAL: healthy, alert and no distress  EYES: Eyes grossly normal to inspection, PERRL and conjunctivae and sclerae normal  HENT: ear canals and TM's normal, nose and mouth without ulcers or lesions  NECK: no adenopathy, no asymmetry, masses, or scars and thyroid normal to palpation  RESP: lungs clear to auscultation - no rales, rhonchi or wheezes  BREAST: normal without masses, tenderness or nipple discharge and no palpable axillary masses or adenopathy  CV: regular rate and rhythm, normal S1 S2, no S3 or S4, no murmur, click or rub, no peripheral edema and peripheral pulses strong  ABDOMEN: soft, nontender, no hepatosplenomegaly, no masses and bowel sounds normal  MS: no gross musculoskeletal defects noted, no edema  SKIN: no suspicious lesions or rashes  NEURO: Normal strength and tone, mentation intact and speech normal  PSYCH: mentation appears normal, affect normal/bright        ASSESSMENT/PLAN:   (Z00.00) Routine general medical examination at a health care facility  (primary encounter diagnosis)  Comment:   Plan:     (F41.9) Anxiety  Comment: stable  Plan: " busPIRone (BUSPAR) 10 MG tablet        The current medical regimen is effective;  continue present plan and medications.  Discussed a taper schedule when desired (see Pt Instructions).             COUNSELING:  Reviewed preventive health counseling, as reflected in patient instructions        She reports that she has never smoked. She has never used smokeless tobacco.          Shruti Lim NP  [facility]  Answers for HPI/ROS submitted by the patient on 2/8/2023  LUIS A 7 TOTAL SCORE: 1

## 2023-02-11 ENCOUNTER — TELEPHONE (OUTPATIENT)
Dept: FAMILY MEDICINE | Facility: CLINIC | Age: 38
End: 2023-02-11

## 2023-02-11 DIAGNOSIS — F41.9 ANXIETY: ICD-10-CM

## 2023-02-12 NOTE — TELEPHONE ENCOUNTER
There are 2 sets of directions for busPIRone (BUSPAR) 10 MG tablet.  Please send a new Rx with the current/correct directions.  Thanks.

## 2023-02-13 RX ORDER — BUSPIRONE HYDROCHLORIDE 10 MG/1
10 TABLET ORAL 2 TIMES DAILY
Qty: 180 TABLET | Refills: 3 | Status: SHIPPED | OUTPATIENT
Start: 2023-02-13

## 2023-06-23 ENCOUNTER — TRANSFERRED RECORDS (OUTPATIENT)
Dept: HEALTH INFORMATION MANAGEMENT | Facility: CLINIC | Age: 38
End: 2023-06-23
Payer: COMMERCIAL

## 2023-10-20 ENCOUNTER — DOCUMENTATION ONLY (OUTPATIENT)
Dept: FAMILY MEDICINE | Facility: CLINIC | Age: 38
End: 2023-10-20
Payer: COMMERCIAL

## 2023-10-20 ENCOUNTER — MYC MEDICAL ADVICE (OUTPATIENT)
Dept: FAMILY MEDICINE | Facility: CLINIC | Age: 38
End: 2023-10-20
Payer: COMMERCIAL

## 2023-10-20 DIAGNOSIS — H04.123 DRY EYES: ICD-10-CM

## 2023-10-20 DIAGNOSIS — E03.9 HYPOTHYROIDISM, UNSPECIFIED TYPE: Primary | ICD-10-CM

## 2023-10-20 NOTE — TELEPHONE ENCOUNTER
Carito: pended or can recommend visit    My eye doctor recommended I reach out to my primary care doctor to order a lab test for Sjogrens autoimmune disease due to my extreme dry eyes and dry mouth. I don t have a lab request from the eye doctor, they said to just contact my primary care doctor to get the order in and tested.     ARIELLE Ross [facility]

## 2023-10-20 NOTE — PROGRESS NOTES
I just spoke with Lindsay, she said here eye doctor wanted a test for Sjogren's because she has dry eye.    She will be sending a [employer] message to you with more details.      If you aren't comfortable ordering labs for that, please let her know.    Thanks,     Patience

## 2023-10-20 NOTE — PROGRESS NOTES
Carito --    Verifying that the order for autoimmune lab will cover Sjogren's.     Leslie Smalls, BSN RN  [facility]

## 2023-10-20 NOTE — PROGRESS NOTES
Lindsay THORNTON Ishantimmy has an upcoming lab appointment.        Future Appointments   Date Time Provider Department [place]   10/24/2023 10:45 AM [facility]    12/11/2023  7:30 AM [facility]       The appointment note says: LAB - Order unknown - test for autoimmune     There is no Lab order available.      Please review and place future orders as appropriate.  If no Lab order will be placed, please advise patient.      Also for consideration: [facility]    Health Maintenance Due   Topic    TSH W/FREE T4 REFLEX        Thanks,    Patience Mccann

## 2023-10-23 NOTE — TELEPHONE ENCOUNTER
Writer responded via [employer].  MICHELLE LandrumN, RN-BC  [facility]

## 2023-10-24 ENCOUNTER — LAB (OUTPATIENT)
Dept: LAB | Facility: CLINIC | Age: 38
End: 2023-10-24
Payer: COMMERCIAL

## 2023-10-24 DIAGNOSIS — H04.123 DRY EYES: ICD-10-CM

## 2023-10-24 DIAGNOSIS — E03.9 HYPOTHYROIDISM, UNSPECIFIED TYPE: ICD-10-CM

## 2023-10-24 DIAGNOSIS — E03.9 HYPOTHYROIDISM: Primary | ICD-10-CM

## 2023-10-24 LAB — HOLD SPECIMEN: NORMAL

## 2023-10-24 PROCEDURE — 86235 NUCLEAR ANTIGEN ANTIBODY: CPT

## 2023-10-24 PROCEDURE — 86235 NUCLEAR ANTIGEN ANTIBODY: CPT | Mod: 59

## 2023-10-24 PROCEDURE — 36415 COLL VENOUS BLD VENIPUNCTURE: CPT

## 2023-10-26 LAB
ENA SS-A AB SER IA-ACNC: 0.6 U/ML
ENA SS-A AB SER IA-ACNC: NEGATIVE
ENA SS-B IGG SER IA-ACNC: <0.6 U/ML
ENA SS-B IGG SER IA-ACNC: NEGATIVE

## 2024-04-28 ENCOUNTER — HEALTH MAINTENANCE LETTER (OUTPATIENT)
Age: 39
End: 2024-04-28

## 2024-05-14 SDOH — HEALTH STABILITY: PHYSICAL HEALTH: ON AVERAGE, HOW MANY MINUTES DO YOU ENGAGE IN EXERCISE AT THIS LEVEL?: 30 MIN

## 2024-05-14 SDOH — HEALTH STABILITY: PHYSICAL HEALTH: ON AVERAGE, HOW MANY DAYS PER WEEK DO YOU ENGAGE IN MODERATE TO STRENUOUS EXERCISE (LIKE A BRISK WALK)?: 5 DAYS

## 2024-05-14 ASSESSMENT — SOCIAL DETERMINANTS OF HEALTH (SDOH): HOW OFTEN DO YOU GET TOGETHER WITH FRIENDS OR RELATIVES?: ONCE A WEEK

## 2024-05-14 ASSESSMENT — PATIENT HEALTH QUESTIONNAIRE - PHQ9
10. IF YOU CHECKED OFF ANY PROBLEMS, HOW DIFFICULT HAVE THESE PROBLEMS MADE IT FOR YOU TO DO YOUR WORK, TAKE CARE OF THINGS AT HOME, OR GET ALONG WITH OTHER PEOPLE: NOT DIFFICULT AT ALL
SUM OF ALL RESPONSES TO PHQ QUESTIONS 1-9: 2
SUM OF ALL RESPONSES TO PHQ QUESTIONS 1-9: 2

## 2024-05-15 ENCOUNTER — OFFICE VISIT (OUTPATIENT)
Dept: FAMILY MEDICINE | Facility: CLINIC | Age: 39
End: 2024-05-15
Payer: COMMERCIAL

## 2024-05-15 VITALS
SYSTOLIC BLOOD PRESSURE: 119 MMHG | TEMPERATURE: 97.1 F | HEIGHT: 71 IN | OXYGEN SATURATION: 100 % | BODY MASS INDEX: 23.38 KG/M2 | DIASTOLIC BLOOD PRESSURE: 83 MMHG | RESPIRATION RATE: 17 BRPM | HEART RATE: 71 BPM | WEIGHT: 167 LBS

## 2024-05-15 DIAGNOSIS — Z00.00 ROUTINE GENERAL MEDICAL EXAMINATION AT A HEALTH CARE FACILITY: Primary | ICD-10-CM

## 2024-05-15 DIAGNOSIS — F41.9 ANXIETY: ICD-10-CM

## 2024-05-15 PROCEDURE — 80061 LIPID PANEL: CPT | Performed by: NURSE PRACTITIONER

## 2024-05-15 PROCEDURE — 99395 PREV VISIT EST AGE 18-39: CPT | Performed by: NURSE PRACTITIONER

## 2024-05-15 PROCEDURE — 82947 ASSAY GLUCOSE BLOOD QUANT: CPT | Performed by: NURSE PRACTITIONER

## 2024-05-15 PROCEDURE — 36415 COLL VENOUS BLD VENIPUNCTURE: CPT | Performed by: NURSE PRACTITIONER

## 2024-05-15 RX ORDER — BUSPIRONE HYDROCHLORIDE 10 MG/1
10 TABLET ORAL 2 TIMES DAILY
Qty: 180 TABLET | Refills: 3 | Status: CANCELLED | OUTPATIENT
Start: 2024-05-15

## 2024-05-15 ASSESSMENT — PAIN SCALES - GENERAL: PAINLEVEL: NO PAIN (0)

## 2024-05-15 ASSESSMENT — ANXIETY QUESTIONNAIRES
IF YOU CHECKED OFF ANY PROBLEMS ON THIS QUESTIONNAIRE, HOW DIFFICULT HAVE THESE PROBLEMS MADE IT FOR YOU TO DO YOUR WORK, TAKE CARE OF THINGS AT HOME, OR GET ALONG WITH OTHER PEOPLE: NOT DIFFICULT AT ALL
3. WORRYING TOO MUCH ABOUT DIFFERENT THINGS: NOT AT ALL
7. FEELING AFRAID AS IF SOMETHING AWFUL MIGHT HAPPEN: NOT AT ALL
4. TROUBLE RELAXING: SEVERAL DAYS
2. NOT BEING ABLE TO STOP OR CONTROL WORRYING: NOT AT ALL
8. IF YOU CHECKED OFF ANY PROBLEMS, HOW DIFFICULT HAVE THESE MADE IT FOR YOU TO DO YOUR WORK, TAKE CARE OF THINGS AT HOME, OR GET ALONG WITH OTHER PEOPLE?: NOT DIFFICULT AT ALL
GAD7 TOTAL SCORE: 2
6. BECOMING EASILY ANNOYED OR IRRITABLE: SEVERAL DAYS
7. FEELING AFRAID AS IF SOMETHING AWFUL MIGHT HAPPEN: NOT AT ALL
1. FEELING NERVOUS, ANXIOUS, OR ON EDGE: NOT AT ALL
GAD7 TOTAL SCORE: 2
5. BEING SO RESTLESS THAT IT IS HARD TO SIT STILL: NOT AT ALL

## 2024-05-15 NOTE — PATIENT INSTRUCTIONS
"Decrease afternoon Buspar dose to 1/2 tablet for 2 weeks, then decrease to 1/2 tablet twice daily for 2 weeks, then stop afternoon dose and continue 1/2 tablet at bedtime for 2 weeks, then stop bedtime dose.      [facility] [phone] (mammogram)  Preventive Care Advice   This is general advice we often give to help people stay healthy. Your care team may have specific advice just for you. Please talk to your care team about your own preventive care needs.  Lifestyle  Exercise at least 150 minutes each week (30 minutes a day, 5 days a week).  Do muscle strengthening activities 2 days a week. These help control your weight and prevent disease.  No smoking.  Wear sunscreen to prevent skin cancer.  Have your home tested for radon every 2 to 5 years. Radon is a colorless, odorless gas that can harm your lungs. To learn more, go to www.health.[facility].mn. and search for \"Radon in Homes.\"  Keep guns unloaded and locked up in a safe place like a safe or gun vault, or, use a gun lock and hide the keys. Always lock away bullets separately. To learn more, visit [place].gov and search for \"safe gun storage.\"  Nutrition  Eat 5 or more servings of fruits and vegetables each day.  Try wheat bread, brown rice and whole grain pasta (instead of white bread, rice, and pasta).  Get enough calcium and vitamin D. Check the label on foods and aim for 100% of the RDA (recommended daily allowance).  Regular exams  Have a dental exam and cleaning every 6 months.  See your health care team every year to talk about:  Any changes in your health.  Any medicines your care team has prescribed.  Preventive care, family planning, and ways to prevent chronic diseases.  Shots (vaccines)   HPV shots (up to age 26), if you've never had them before.  Hepatitis B shots (up to age 59), if you've never had them before.  COVID-19 shot: Get this shot when it's due.  Flu shot: Get a flu shot every year.  Tetanus shot: Get a tetanus shot every 10 " years.  Pneumococcal, hepatitis A, and RSV shots: Ask your care team if you need these based on your risk.  Shingles shot (for age 50 and up).  General health tests  Diabetes screening:  Starting at age 35, Get screened for diabetes at least every 3 years.  If you are younger than age 35, ask your care team if you should be screened for diabetes.  Cholesterol test: At age 39, start having a cholesterol test every 5 years, or more often if advised.  Bone density scan (DEXA): At age 50, ask your care team if you should have this scan for osteoporosis (brittle bones).  Hepatitis C: Get tested at least once in your life.  Abdominal aortic aneurysm screening: Talk to your doctor about having this screening if you:  Have ever smoked; and  Are biologically male; and  Are between the ages of 65 and 75.  STIs (sexually transmitted infections)  Before age 24: Ask your care team if you should be screened for STIs.  After age 24: Get screened for STIs if you're at risk. You are at risk for STIs (including HIV) if:  You are sexually active with more than one person.  You don't use condoms every time.  You or a partner was diagnosed with a sexually transmitted infection.  If you are at risk for HIV, ask about PrEP medicine to prevent HIV.  Get tested for HIV at least once in your life, whether you are at risk for HIV or not.  Cancer screening tests  Cervical cancer screening: If you have a cervix, begin getting regular cervical cancer screening tests at age 21. Most people who have regular screenings with normal results can stop after age 65. Talk about this with your provider.  Breast cancer scan (mammogram): If you've ever had breasts, begin having regular mammograms starting at age 40. This is a scan to check for breast cancer.  Colon cancer screening: It is important to start screening for colon cancer at age 45.  Have a colonoscopy test every 10 years (or more often if you're at risk) Or, ask your provider about stool tests  like a FIT test every year or Cologuard test every 3 years.  To learn more about your testing options, visit: www.[employer]/645841.pdf.  For help making a decision, visit: rylan/no60414.  Prostate cancer screening test: If you have a prostate and are age 55 to 69, ask your provider if you would benefit from a yearly prostate cancer screening test.  Lung cancer screening: If you are a current or former smoker age 50 to 80, ask your care team if ongoing lung cancer screenings are right for you.  For informational purposes only. Not to replace the advice of your health care provider. Copyright   2023 [employer]. All rights reserved. Clinically reviewed by the [facility]. [employer] [id number] - REV 04/24.    Learning About Stress  What is stress?     Stress is your body's response to a hard situation. Your body can have a physical, emotional, or mental response. Stress is a fact of life for most people, and it affects everyone differently. What causes stress for you may not be stressful for someone else.  A lot of things can cause stress. You may feel stress when you go on a job interview, take a test, or run a race. This kind of short-term stress is normal and even useful. It can help you if you need to work hard or react quickly. For example, stress can help you finish an important job on time.  Long-term stress is caused by ongoing stressful situations or events. Examples of long-term stress include long-term health problems, ongoing problems at work, or conflicts in your family. Long-term stress can harm your health.  How does stress affect your health?  When you are stressed, your body responds as though you are in danger. It makes hormones that speed up your heart, make you breathe faster, and give you a burst of energy. This is called the fight-or-flight stress response. If the stress is over quickly, your body goes back to normal and no harm is done.  But if stress  happens too often or lasts too long, it can have bad effects. Long-term stress can make you more likely to get sick, and it can make symptoms of some diseases worse. If you tense up when you are stressed, you may develop neck, shoulder, or low back pain. Stress is linked to high blood pressure and heart disease.  Stress also harms your emotional health. It can make you membreno, tense, or depressed. Your relationships may suffer, and you may not do well at work or school.  What can you do to manage stress?  You can try these things to help manage stress:   Do something active. Exercise or activity can help reduce stress. Walking is a great way to get started. Even everyday activities such as housecleaning or yard work can help.  Try yoga or max chi. These techniques combine exercise and meditation. You may need some training at first to learn them.  Do something you enjoy. For example, listen to music or go to a movie. Practice your hobby or do volunteer work.  Meditate. This can help you relax, because you are not worrying about what happened before or what may happen in the future.  Do guided imagery. Imagine yourself in any setting that helps you feel calm. You can use online videos, books, or a teacher to guide you.  Do breathing exercises. For example:  From a standing position, bend forward from the waist with your knees slightly bent. Let your arms dangle close to the floor.  Breathe in slowly and deeply as you return to a standing position. Roll up slowly and lift your head last.  Hold your breath for just a few seconds in the standing position.  Breathe out slowly and bend forward from the waist.  Let your feelings out. Talk, laugh, cry, and express anger when you need to. Talking with supportive friends or family, a counselor, or a asha leader about your feelings is a healthy way to relieve stress. Avoid discussing your feelings with people who make you feel worse.  Write. It may help to write about things  "that are bothering you. This helps you find out how much stress you feel and what is causing it. When you know this, you can find better ways to cope.  What can you do to prevent stress?  You might try some of these things to help prevent stress:  Manage your time. This helps you find time to do the things you want and need to do.  Get enough sleep. Your body recovers from the stresses of the day while you are sleeping.  Get support. Your family, friends, and community can make a difference in how you experience stress.  Limit your news feed. Avoid or limit time on social media or news that may make you feel stressed.  Do something active. Exercise or activity can help reduce stress. Walking is a great way to get started.  Where can you learn more?  Go to https://www.[employer].net/patiented  Enter N032 in the search box to learn more about \"Learning About Stress.\"  Current as of: October 24, 2023               Content Version: 14.0    7462-8847 [employer].   Care instructions adapted under license by your healthcare professional. If you have questions about a medical condition or this instruction, always ask your healthcare professional. [employer] disclaims any warranty or liability for your use of this information.      "

## 2024-05-15 NOTE — PROGRESS NOTES
Preventive Care Visit  [facility]  Shruti Lim, NP, Nurse Practitioner - Family  May 15, 2024      Assessment & Plan     (Z00.00) Routine general medical examination at a health care facility  (primary encounter diagnosis)  Comment:   Plan: Glucose, Lipid panel reflex to direct LDL         Fasting            (F41.9) Anxiety  Comment:   Plan: Decrease afternoon Buspar dose to 1/2 tablet for 2 weeks, then decrease to 1/2 tablet twice daily for 2 weeks, then stop afternoon dose and continue 1/2 tablet at bedtime for 2 weeks, then stop bedtime dose.              Counseling  Appropriate preventive services were discussed with this patient, including applicable screening as appropriate for fall prevention, nutrition, physical activity, Tobacco-use cessation, weight loss and cognition.  Checklist reviewing preventive services available has been given to the patient.  Reviewed patient's diet, addressing concerns and/or questions.   She is at risk for psychosocial distress and has been provided with information to reduce risk.           Curtis Montoya is a 38 year old, presenting for the following:  Physical        5/15/2024     6:55 AM   Additional Questions   Roomed by Lilian COLBY   Accompanied by self        Health Care Directive  Patient does not have a Health Care Directive or Living Will: Patient states has Advance Directive and will bring in a copy to clinic.    HPI  She would like to try to get off of Buspar, as she has been on it for several years and her anxiety has been well-managed.             5/14/2024   General Health   How would you rate your overall physical health? Good   Feel stress (tense, anxious, or unable to sleep) Only a little   (!) STRESS CONCERN      5/14/2024   Nutrition   Three or more servings of calcium each day? Yes   Diet: Gluten-free/reduced   How many servings of fruit and vegetables per day? (!) 2-3   How many sweetened beverages each day? 0-1          "5/14/2024   Exercise   Days per week of moderate/strenous exercise 5 days   Average minutes spent exercising at this level 30 min         5/14/2024   Social Factors   Frequency of gathering with friends or relatives Once a week   Worry food won't last until get money to buy more No   Food not last or not have enough money for food? No   Do you have housing?  Yes   Are you worried about losing your housing? No   Lack of transportation? No   Unable to get utilities (heat,electricity)? No         5/14/2024   Dental   Dentist two times every year? Yes         5/14/2024   TB Screening   Were you born outside of the [place]? No       Today's PHQ-9 Score:       5/14/2024    11:04 AM   PHQ-9 SCORE   PHQ-9 Total Score MyChart 2 (Minimal depression)   PHQ-9 Total Score 2         5/14/2024   Substance Use   Alcohol more than 3/day or more than 7/wk No   Do you use any other substances recreationally? No     Social History     Tobacco Use    Smoking status: Never    Smokeless tobacco: Never   Vaping Use    Vaping status: Never Used   Substance Use Topics    Alcohol use: Yes     Alcohol/week: 0.0 standard drinks of alcohol     Comment: occasional    Drug use: No                  5/14/2024   STI Screening   New sexual partner(s) since last STI/HIV test? No     History of abnormal Pap smear: No - age 30- 64 PAP with HPV every 5 years recommended        Latest Ref Rng & Units 12/15/2021     8:56 AM   PAP / HPV   PAP  Negative for Intraepithelial Lesion or Malignancy (NILM)    HPV 16 DNA Negative Negative    HPV 18 DNA Negative Negative    Other HR HPV Negative Negative            5/14/2024   Contraception/Family Planning   Questions about contraception or family planning No        Reviewed and updated as needed this visit by Provider                             Objective    Exam  LMP 05/13/2024 (Exact Date)    Estimated body mass index is 22.04 kg/m  as calculated from the following:    Height as of 2/8/23: 1.803 m (5' 11\").    Weight " as of 2/8/23: 71.7 kg (158 lb).    Physical Exam  GENERAL: alert and no distress  EYES: Eyes grossly normal to inspection, PERRL and conjunctivae and sclerae normal  HENT: ear canals and TM's normal, nose and mouth without ulcers or lesions  NECK: no adenopathy, no asymmetry, masses, or scars  RESP: lungs clear to auscultation - no rales, rhonchi or wheezes  BREAST: normal without masses, tenderness or nipple discharge and no palpable axillary masses or adenopathy  CV: regular rate and rhythm, normal S1 S2, no S3 or S4, no murmur, click or rub, no peripheral edema  ABDOMEN: soft, nontender, no hepatosplenomegaly, no masses and bowel sounds normal  MS: no gross musculoskeletal defects noted, no edema  SKIN: no suspicious lesions or rashes  NEURO: Normal strength and tone, mentation intact and speech normal  PSYCH: mentation appears normal, affect normal/bright        Signed Electronically by: Shruti Lim NP    Answers submitted by the patient for this visit:  Patient Health Questionnaire (Submitted on 5/14/2024)  If you checked off any problems, how difficult have these problems made it for you to do your work, take care of things at home, or get along with other people?: Not difficult at all  PHQ9 TOTAL SCORE: 2  LUIS A-7 (Submitted on 5/15/2024)  LUIS A 7 TOTAL SCORE: 2

## 2024-05-16 LAB
CHOLEST SERPL-MCNC: 197 MG/DL
FASTING STATUS PATIENT QL REPORTED: YES
FASTING STATUS PATIENT QL REPORTED: YES
GLUCOSE SERPL-MCNC: 92 MG/DL (ref 70–99)
HDLC SERPL-MCNC: 80 MG/DL
LDLC SERPL CALC-MCNC: 106 MG/DL
NONHDLC SERPL-MCNC: 117 MG/DL
TRIGL SERPL-MCNC: 57 MG/DL

## 2024-11-24 ENCOUNTER — HEALTH MAINTENANCE LETTER (OUTPATIENT)
Age: 39
End: 2024-11-24

## 2024-12-08 ENCOUNTER — MYC MEDICAL ADVICE (OUTPATIENT)
Dept: FAMILY MEDICINE | Facility: CLINIC | Age: 39
End: 2024-12-08
Payer: COMMERCIAL

## 2024-12-08 DIAGNOSIS — I83.813 VARICOSE VEINS OF BOTH LOWER EXTREMITIES WITH PAIN: Primary | ICD-10-CM

## 2024-12-10 NOTE — TELEPHONE ENCOUNTER
Referral for vein specialist placed.  She can go anywhere for mammogram.  Easiest to just schedule on [facility] and chose whichever location works best for her.  I have no idea why she was contacted to follow up.  She can disregard - not due for physical until May.

## 2025-01-27 ENCOUNTER — OFFICE VISIT (OUTPATIENT)
Dept: VASCULAR SURGERY | Facility: CLINIC | Age: 40
End: 2025-01-27
Attending: NURSE PRACTITIONER
Payer: COMMERCIAL

## 2025-01-27 DIAGNOSIS — I83.813 VARICOSE VEINS OF BOTH LOWER EXTREMITIES WITH PAIN: ICD-10-CM

## 2025-01-27 NOTE — LETTER
1/27/2025      Lindsay Burch  [address] [place] [address]      Dear Colleague,    Thank you for referring your patient, Lindsay Burch, to the [facility] [place]. Please see a copy of my visit note below.        Vein Clinic Consultation Note    HPI:  Lindsay Burch is a 39 year old female who was seen today in consultation for left lower extremity symptomatic varicose veins and likely anterior accessory saphenous vein origin.  She has a working mother and her left leg varicosities have progressed over the past 4 years.  She has been wearing compression stockings regularly but the symptoms have progressed.    She has left leg varicose vein itchiness, leg achiness and throbbing.  It affects her ability to exercise, exercise makes her left leg throb worse and stops her from exercising further.  She also has to take frequent breaks when working to rest and elevate the leg.      ROS    PHH:    Past Medical History:   Diagnosis Date     Depressive disorder 2016    After 1st pregnancy     Thyroid disease 2015    During 1st pregnancy        Past Surgical History:   Procedure Laterality Date     OPERATIVE HYSTEROSCOPY WITH MORCELLATOR  1/2/2014    Procedure: OPERATIVE HYSTEROSCOPY WITH MORCELLATOR;  OPERATIVE HYSTEROSCOPY, RESECTION OF ENDOMETRIAL POLYP;  Surgeon: Elizabeth Stover MD;  Location: [facility]     wisdom teeth[         ALLERGIES:  Patient has no known allergies.    No Known Allergies    MEDS:    Current Outpatient Medications:      levothyroxine (SYNTHROID, LEVOTHROID) 88 MCG tablet, Take 1 tablet (88 mcg) by mouth daily, Disp: 90 tablet, Rfl: 0     liothyronine (CYTOMEL) 5 MCG tablet, Take 1 tablet by mouth every evening, Disp: , Rfl:      Multiple Vitamins-Minerals (MULTIVITAMIN ADULTS PO), Take 1 tablet by mouth At Bedtime, Disp: , Rfl:      Omega-3 Fatty Acids (OMEGA-3 FISH OIL PO), Take by mouth daily, Disp: , Rfl:      VITAMIN D, CHOLECALCIFEROL, PO, Take 5,000  Units by mouth daily, Disp: , Rfl:      XIIDRA 5 % opthalmic solution, INSTILL 1 DROP TWICE DAILY IN BOTH EYES FOR DRY EYE SYNDROME INDEFINITELY., Disp: , Rfl:     SOCIAL HABITS:    History   Smoking Status     Never   Smokeless Tobacco     Never     Social History    Substance and Sexual Activity      Alcohol use: Yes        Alcohol/week: 0.0 standard drinks of alcohol        Comment: occasional      History   Drug Use No       FAMILY HISTORY:    Family History   Problem Relation Age of Onset     Hypertension Mother      Hypertension Father      Anxiety Disorder Sister        Results for orders placed or performed during the hospital encounter of 07/27/22   US Thyroid    Narrative    ULTRASOUND THYROID July 27, 2022 10:33 AM    CLINICAL HISTORY: Hypothyroidism.    TECHNIQUE: Thyroid ultrasound.     COMPARISON: None.     FINDINGS:  RIGHT lobe: 4.9 x 1 x 0.8 cm. Homogeneous echotexture.  Isthmus: 1 mm.  LEFT lobe: 4.6 x 1 x 0.8 cm. Homogeneous echotexture.    NECK: No cervical lymphadenopathy.    NODULES:    Nodule 1: 9 x 7 x 7 mm, upper pole right   Composition: Mixed cystic and solid, 1 point   Echogenicity: Hyperechoic or isoechoic, 1 point   Shape: Wider-than-tall, 0 points   Margin: Ill-defined, 0 points   Echogenic Foci: None, or large comet-tail artifacts, 0 points   Point Total: 1-2 points. TI-RADS 2. No FNA.        Impression    IMPRESSION: 9 mm TI-RADS category 2 nodule on the right. No follow-up  needed.    Nodules are characterized per  ACR Thyroid Imaging, Reporting and Data System (TI-RADS): White Paper  of the ACR TI-RADS Committee  Tony Mendoza. et al. Journal of the American College of  Radiology 2017. Volume 14 (2017), Issue 5, 582-041.     MIRIAM COUGHLIN MD         SYSTEM ID:  [id number]       VEIN CLINIC LEG DRAWING    Patient History  If you have varicose or spider veins/legs, when did they occur?: After pregnancy (Left leg varicose veins after pregnancy 10 years ago and becoming more  painful/symptomatic with time)  Please describe your expectations of therapy:: discuss treatment options    Past Treatment:  Have you ever been treated for the above problem(s)? : No  Have you ever worn prescription stockings?: Yes  Do you currently wear compression stockings?: Yes  Length of time compression stockings worn:: intermittently for the last 4 years, in the last year has been wearing them more frequently (about 4 days per week)  Do you use medications to relieve your leg pain?: Yes  How do you take your medication?: As needed  List medication:: Ibuprofen/Acetaminophen  Do your symptoms, caused by VV or Spider Veins, interfere with work or activities of daily living?: Yes  Please, explain:: pain is interfering with physical exercise    For Varicose and Spider Veins of the Legs:  Side:: Left  Do you have now:: Pain in thigh; Pain in calf  The pain is made worse with...: -- (physical exercise)  The pain is made better with...: Compression Hose; Medication; Elevation of the leg  Your pain feels like:: an ache/tiredness/heaviness    Do you have a history of any of the following?: Family history of varicose/spider veins (mother, father, brother, sister have varicose veins)  Side:: Left    VCSS  PAIN:: 2  Varicose Veins:: 2  Venous Edema:: 0  Skin Pigmentation:: 0  Inflamation:: 0  Induration:: 0  Number of active ulcers:: 0  Active ulcer duration:: 0  Active ulcer diameter:: 0  Compression Therapy:: 2  VCSS Score:: 6    CEAP:  CEAP:: C2         ASSESSMENT: Symptomatic left lower extremity varicose veins despite compression stockings greater than 4 years with likely anterior accessory saphenous vein source.  The discomfort and pain with throbbing and itchiness and achiness limits her ability to exercise and requires her to take frequent breaks to elevate the leg when working.    PLAN: Bilateral venous competency testing to assess, clinical follow-up with me to discuss options.  Likely left anterior accessory  saphenous vein VenaSeal closure.  Patient may opt to do elective sclerotherapy and phlebectomy at the time of VenaSeal closure to prevent partial thrombosis and more difficult extraction down the road.      Ángel Corley MD    45 minutes spent today reviewing chart, discussing with patient and postvisit charting.  The longitudinal plan of care for the diagnosis(es)/condition(s) as documented were addressed during this visit. Due to the added complexity in care, I will continue to support Lindsay in the subsequent management and with ongoing continuity of care.      Again, thank you for allowing me to participate in the care of your patient.        Sincerely,        Ángel Corley MD    Electronically signed

## 2025-01-27 NOTE — PROGRESS NOTES
Vein Clinic Consultation Note    HPI:  Lindsay Burch is a 39 year old female who was seen today in consultation for left lower extremity symptomatic varicose veins and likely anterior accessory saphenous vein origin.  She has a working mother and her left leg varicosities have progressed over the past 4 years.  She has been wearing compression stockings regularly but the symptoms have progressed.    She has left leg varicose vein itchiness, leg achiness and throbbing.  It affects her ability to exercise, exercise makes her left leg throb worse and stops her from exercising further.  She also has to take frequent breaks when working to rest and elevate the leg.      ROS    PHH:    Past Medical History:   Diagnosis Date    Depressive disorder 2016    After 1st pregnancy    Thyroid disease 2015    During 1st pregnancy        Past Surgical History:   Procedure Laterality Date    OPERATIVE HYSTEROSCOPY WITH MORCELLATOR  1/2/2014    Procedure: OPERATIVE HYSTEROSCOPY WITH MORCELLATOR;  OPERATIVE HYSTEROSCOPY, RESECTION OF ENDOMETRIAL POLYP;  Surgeon: Elizabeth Stover MD;  Location: [facility]    wisdom teeth[         ALLERGIES:  Patient has no known allergies.    No Known Allergies    MEDS:    Current Outpatient Medications:     levothyroxine (SYNTHROID, LEVOTHROID) 88 MCG tablet, Take 1 tablet (88 mcg) by mouth daily, Disp: 90 tablet, Rfl: 0    liothyronine (CYTOMEL) 5 MCG tablet, Take 1 tablet by mouth every evening, Disp: , Rfl:     Multiple Vitamins-Minerals (MULTIVITAMIN ADULTS PO), Take 1 tablet by mouth At Bedtime, Disp: , Rfl:     Omega-3 Fatty Acids (OMEGA-3 FISH OIL PO), Take by mouth daily, Disp: , Rfl:     VITAMIN D, CHOLECALCIFEROL, PO, Take 5,000 Units by mouth daily, Disp: , Rfl:     XIIDRA 5 % opthalmic solution, INSTILL 1 DROP TWICE DAILY IN BOTH EYES FOR DRY EYE SYNDROME INDEFINITELY., Disp: , Rfl:     SOCIAL HABITS:    History   Smoking Status    Never   Smokeless Tobacco    Never     Social  History    Substance and Sexual Activity      Alcohol use: Yes        Alcohol/week: 0.0 standard drinks of alcohol        Comment: occasional      History   Drug Use No       FAMILY HISTORY:    Family History   Problem Relation Age of Onset    Hypertension Mother     Hypertension Father     Anxiety Disorder Sister        Results for orders placed or performed during the hospital encounter of 07/27/22   US Thyroid    Narrative    ULTRASOUND THYROID July 27, 2022 10:33 AM    CLINICAL HISTORY: Hypothyroidism.    TECHNIQUE: Thyroid ultrasound.     COMPARISON: None.     FINDINGS:  RIGHT lobe: 4.9 x 1 x 0.8 cm. Homogeneous echotexture.  Isthmus: 1 mm.  LEFT lobe: 4.6 x 1 x 0.8 cm. Homogeneous echotexture.    NECK: No cervical lymphadenopathy.    NODULES:    Nodule 1: 9 x 7 x 7 mm, upper pole right   Composition: Mixed cystic and solid, 1 point   Echogenicity: Hyperechoic or isoechoic, 1 point   Shape: Wider-than-tall, 0 points   Margin: Ill-defined, 0 points   Echogenic Foci: None, or large comet-tail artifacts, 0 points   Point Total: 1-2 points. TI-RADS 2. No FNA.        Impression    IMPRESSION: 9 mm TI-RADS category 2 nodule on the right. No follow-up  needed.    Nodules are characterized per  ACR Thyroid Imaging, Reporting and Data System (TI-RADS): White Paper  of the ACR TI-RADS Committee  Tony Mendoza. et al. Journal of the American College of  Radiology 2017. Volume 14 (2017), Issue 5, 403-672.     MIRIAM COUGHLIN MD         SYSTEM ID:  [id number]       VEIN CLINIC LEG DRAWING    Patient History  If you have varicose or spider veins/legs, when did they occur?: After pregnancy (Left leg varicose veins after pregnancy 10 years ago and becoming more painful/symptomatic with time)  Please describe your expectations of therapy:: discuss treatment options    Past Treatment:  Have you ever been treated for the above problem(s)? : No  Have you ever worn prescription stockings?: Yes  Do you currently wear compression  stockings?: Yes  Length of time compression stockings worn:: intermittently for the last 4 years, in the last year has been wearing them more frequently (about 4 days per week)  Do you use medications to relieve your leg pain?: Yes  How do you take your medication?: As needed  List medication:: Ibuprofen/Acetaminophen  Do your symptoms, caused by VV or Spider Veins, interfere with work or activities of daily living?: Yes  Please, explain:: pain is interfering with physical exercise    For Varicose and Spider Veins of the Legs:  Side:: Left  Do you have now:: Pain in thigh; Pain in calf  The pain is made worse with...: -- (physical exercise)  The pain is made better with...: Compression Hose; Medication; Elevation of the leg  Your pain feels like:: an ache/tiredness/heaviness    Do you have a history of any of the following?: Family history of varicose/spider veins (mother, father, brother, sister have varicose veins)  Side:: Left    VCSS  PAIN:: 2  Varicose Veins:: 2  Venous Edema:: 0  Skin Pigmentation:: 0  Inflamation:: 0  Induration:: 0  Number of active ulcers:: 0  Active ulcer duration:: 0  Active ulcer diameter:: 0  Compression Therapy:: 2  VCSS Score:: 6    CEAP:  CEAP:: C2         ASSESSMENT: Symptomatic left lower extremity varicose veins despite compression stockings greater than 4 years with likely anterior accessory saphenous vein source.  The discomfort and pain with throbbing and itchiness and achiness limits her ability to exercise and requires her to take frequent breaks to elevate the leg when working.    PLAN: Bilateral venous competency testing to assess, clinical follow-up with me to discuss options.  Likely left anterior accessory saphenous vein VenaSeal closure.  Patient may opt to do elective sclerotherapy and phlebectomy at the time of VenaSeal closure to prevent partial thrombosis and more difficult extraction down the road.      Ángel Corley MD    45 minutes spent today reviewing  chart, discussing with patient and postvisit charting.  The longitudinal plan of care for the diagnosis(es)/condition(s) as documented were addressed during this visit. Due to the added complexity in care, I will continue to support Lindsay in the subsequent management and with ongoing continuity of care.

## 2025-01-29 ENCOUNTER — ANCILLARY PROCEDURE (OUTPATIENT)
Dept: ULTRASOUND IMAGING | Facility: CLINIC | Age: 40
End: 2025-01-29
Attending: INTERNAL MEDICINE
Payer: COMMERCIAL

## 2025-01-29 DIAGNOSIS — I83.813 VARICOSE VEINS OF BOTH LOWER EXTREMITIES WITH PAIN: ICD-10-CM

## 2025-01-29 PROCEDURE — 93970 EXTREMITY STUDY: CPT | Performed by: INTERNAL MEDICINE

## 2025-02-10 ENCOUNTER — VIRTUAL VISIT (OUTPATIENT)
Dept: VASCULAR SURGERY | Facility: CLINIC | Age: 40
End: 2025-02-10
Payer: COMMERCIAL

## 2025-02-10 DIAGNOSIS — I83.813 VARICOSE VEINS OF BOTH LOWER EXTREMITIES WITH PAIN: Primary | ICD-10-CM

## 2025-02-10 NOTE — PATIENT INSTRUCTIONS
Guadalupe Lou, Surgery Scheduler - [phone].    Procedure Plan:   1. Left leg Venaseal ASV, 10-20 Phlebectomies (medically necessary), and Ultrasound Guided Sclerotherapy (medically necessary). Patient is aware that phlebectomies and sclerotherapy would be unlikely to be covered by insurance. Phlebectomy self pay: $540.00, Ultrasound guided sclerotherapy self pay: $641.00    2. Right leg Venaseal GSV    Please call our office regarding the status of your insurance authorization if it has been more than 3 weeks and you have not heard from our surgery scheduler.         Pre-Procedure Instructions:        Right leg VenaSeal  You are having a Venaseal. One or more of your veins will be closed with glue.    Insurance  Precertification and/or referral authorization may be required by your insurance company.  We will call your insurance company to verify benefits for the medically necessary part of your procedure.    Your Current Medications and Allergies  Are you on blood thinner medications? (Aspirin, Plavix, Coumadin, Eliquis, Xarelto) Please discuss this with your surgeon.  Are you sensitive to latex or adhesives used for fake fingernails? Please let us know!     Your Health  If you have a change in your health before the procedure, contact our office immediately.  (For example: cold symptoms, cough, urinary tract infection, fever, flu symptoms.)  A pre-procedure physical is not required.    Note  It is sometimes necessary to adjust the procedure schedule due to emergencies. We greatly appreciate your flexibility and understanding in this matter.    ____________________________________________________________________________      Check List:  The Morning of Your Procedure  ___1.  Please do not apply anything on your leg(s) or shave the day of your procedure.  ___2.  You may take your normal medications the day of your procedure.  ___3.  It is recommended you eat a light breakfast or lunch on the day of your  procedure.  ___4.  Wear comfortable loose-fitting clothing and wide-fitting shoes (i.e. tennis shoes, slip-ons).  ___5.  Please arrive at our clinic at the specified time given by the nurse.  ___6.  You will sign an affirmation of informed consent.        The Day of Your Procedure:       Right leg VenaSeal  In the Exam Room  A nurse will bring you back to an exam room with your family member or friend. This is when your informed consent will be signed.  You will be asked to remove everything from the waist down, including undergarments. You will then put on a hospital gown or shorts and blue booties.  Your surgeon will come in to answer any questions and martinez the appropriate leg(s) with a marker.  You will be taken to the restroom to empty your bladder before going into the procedure room.    In the Procedure Room  You will be escorted to the procedure room. You will lie on a procedure table covered with a sheet or blanket.  A nurse will put a blood pressure cuff on your arm and a pulse/oxygen monitor on a finger. Your vital signs will be monitored every 15 minutes.  Your gown will be pulled up slightly and the groin exposed for a short period of time. The surgeon's assistant will clean your foot, leg, and groin with an antibacterial solution. We will get you covered up as quickly as possible!  Sterile towels and blue drapes will be used to cover you and the table. You will be asked to keep your hands under the blue drapes during the procedure.    The Procedure  The surgeon will visualize your veins with an ultrasound machine. He or she will then numb your skin and access the vein. A catheter is passed up the vein and positioned with ultrasound guidance. The table will then be tipped head down.  Once the catheter is in the correct position, droplets of glue are deposited into the vein to make the vein walls collapse and stick together stopping blood flow through the vein. These droplets of glue are deposited in  segments, closing each segment as the catheter is withdrawn.    Post-Procedure  Once the procedure is done, your leg will be washed with warm water and dried. You will have one or more small bandages covering your incisions.  You will be offered something to drink and a light snack.  You will rest with your leg(s) elevated for approximately 30 minutes. Your friend or family member may join you.   For your safety, you will be accompanied to your car by a staff member.      Post-Procedure Instructions:         VenaSeal    First 72 hours:   Ibuprofen: If tolerated, take ibuprofen to reduce inflammation whether you have pain or not.                      Take 2 tablets (200mg each) after every meal and at bedtime with a snack.     Aspirin: Take one 325mg aspirin tablet daily to reduce blood clot risk.  **If you are currently taking a blood thinner medication (Aspirin, Plavix, Coumadin, Eliquis, Xarelto), please remain taking UNLESS the surgeon has stated otherwise. You WOULD NOT need to take the Ibuprofen and Aspirin for the first 72 hours after in addition to what you are normally taking.**    Bandage(s):  Keep your bandage(s) on and dry for 48 hours.    Activities:    Resume normal activities as tolerated.  Bathing: Do not take baths, sit in a hot tub, or go swimming for one week. You may shower the day after the procedure.  Medications:  You may continue to take your normal medications including aspirin and ibuprofen.    Call Us If:    You see any areas on your leg that are red and angry in appearance.  You notice any drainage that is milky or cloudy in appearance or that has a foul odor.  You run a temperature of 100.5 or greater.    Post-Op Day Three:  You will have a follow up appointment 2-4 days post-procedure. At this appointment, you will have an ultrasound and we will check your incisions.         Pre-Procedure Instructions:            Left leg VenaSeal and Phlebectomies  You are having a VenaSeal procedure -  where one or more veins are closed and Phlebectomies - where one or more veins are removed.   Insurance  Precertification and/or referral authorization may be required by your insurance company.  We will call your insurance company to verify benefits for the medically necessary part of your procedure.    Your Current Medications and Allergies  To reduce bruising, please do not take aspirin-type medications (Motrin, Aleve, Ibuprofen, Advil, etc.) for three days before your procedure. You may take Tylenol if you need a pain reliever.  Are you on blood thinner medications?  (Plavix, Coumadin, Eliquis, Xarelto)  Please discuss this with your surgeon. You may resume taking your blood thinner medication after your procedure.  Are you sensitive to latex or adhesives used for fake fingernails? Please let us know!    Driving Escort and   Please arrange to have a trusted adult (18 years old or older) drive you to and from the clinic.  For your safety, we recommend you have a trusted adult to stay with you until the next morning.    Your Health  If you have a change in your health before the procedure, contact our office immediately. (For example: cold symptoms, cough, urinary tract infection, fever, flu symptoms.)  A pre-procedure physical is not required.     Note  It is sometimes necessary to adjust the procedure schedule due to emergencies. We greatly appreciate your flexibility and understanding in this matter.                _____    Check List:  The Morning of Your Procedure  ___1.  Please do not put anything on your leg(s) or shave the day of your procedure.  ___2.  You may take your normal medications the day of your procedure  ___3.  It is recommended you eat a light breakfast or lunch the day of your procedure.  ___4.  Wear comfortable loose-fitting clothing and wide-fitting shoes (i.e. tennis shoes, slip-ons).  ___5.  Please arrive at our clinic at the specified time given by the nurse.  ___6.  You will  sign an affirmation of informed consent.  ___7.  Bring your pre-procedure sedation medication (lorazepam and clonidine) with you to the clinic.              One hour before your procedure, you will be instructed to take these medications. The lorazepam              (Ativan) lowers anxiety and sedates you; the clonidine makes the lorazepam more effective. Everyone's               body processes these medications differently. Therefore, reactions to these medications vary. Some               people stay awake and some people sleep through the whole procedure. You may not remember    everything about the procedure or the day. You do not want to make any big decisions for the rest of               the day.    The Day of Your Procedure:                 VenaSeal and Phlebectomies  In the Exam Room  A nurse will bring you back to an exam room with your family member or friend. This is when your informed consent will be signed, and you will take your pre-procedure medications.  You will be asked to remove everything from the waist down, including undergarments. You will then put on a hospital gown or shorts and blue booties.  Your surgeon will come in to answer any questions and to martinez any bulging varicose veins to be removed.  You will be taken to the restroom to empty your bladder before going into the procedure room.    In the Procedure Room  You will be escorted to the procedure room. You will lie on a procedure table covered with a sheet or blanket.  A nurse will put a blood pressure cuff on your arm and a pulse/oxygen monitor on a finger. Your vital signs will be monitored every 15 minutes.  Your gown will be pulled up slightly and the groin exposed for a short period of time. The surgeon's assistant will clean your foot, leg, and groin with an antibacterial solution. We will get you covered up as quickly as possible!  Sterile towels and drapes will be used to cover you and the table. You will be asked to keep your  hands under the drapes during the procedure.  The lights will be turned down. The table will be tipped so sometimes your head is higher than your feet. You may feel like you're going to slide off, but you won't.    The Procedure  The surgeon will visualize your veins with an ultrasound machine. He or she will then numb your skin and access the vein. A catheter is passed up the vein and positioned with ultrasound guidance. The table will then be tipped head down.   Once the catheter is in the correct position, droplets of glue are deposited into the vein to make the vein walls collapse and stick together stopping blood flow through the vein. These droplets of glue are deposited in segments, closing each segment as the catheter is withdrawn.  For the phlebectomy part of the procedure, small incisions are made where the bulging varicose veins have been marked on your leg(s) and these veins will be removed using a small jered hook instrument.    Post-Procedure  Once the procedure is done, your leg(s) will be washed with warm water and dried. Your leg(s) will be bandaged with large soft dressings and a large ACE bandage wrapped from toes to groin.   You will be offered something to drink and a light snack.  You will rest with your leg(s) elevated for approximately 30 minutes. Your friend or family member may join you.  For your safety, you will be taken to your car in a wheelchair. If you are able to, it is good to keep your leg(s) elevated on the car ride home.    Post-Procedure Instructions:                                                                         Venaseal and Phlebectomies     Post-Op Day Zero - The Day of Your Procedure:0  1.  Medication for Pain Control and Inflammation Control   - The numbing medication injected during your procedure will last for several hours. The pre-procedure                 tablets may make you very sleepy and you might not remember everything from the procedure or from                  the day. This will usually wear off by the next day.   - Ibuprofen: If tolerated, take ibuprofen to reduce inflammation whether or not you have pain. For                 three days, take 2 tablets (200mg each) with every meal and at bedtime with a snack. If your pain is                 not controlled with ibuprofen, you may take prescription pain medication (such as Norco), if                 prescribed.   - Aspirin: Take one 325mg aspirin to reduce blood clot risk.   - You may resume taking any medications you were taking before your procedure.   2.  Activity   - Rest with your leg(s) elevated above your heart. This will prevent from swelling and bleeding. You                 do not need to elevate your leg(s) while sleeping at night. You may go upstairs, sit up to eat, use the                 bathroom, and take several five-minute walks. Otherwise, keep your leg(s) elevated. Minimize the                 amount of time you are up on your feet to about 30 minutes at a time.   3.  Bandages   - The incision sites will be covered with soft bandages and an ACE wrap. Keep your bandages on                 and dry for 48 hours. The ACE should provide  snug  compression but should not cause pain or                 numbness in the toes. If you have significant discomfort or your toes become cold or numb, unwrap                 your ACE and rewrap with less tension starting at the toes wrapping upward.   4.  Incisions   - Bleeding: You may see some incision sites that are oozing through the bandages. This is not unusual                 and can be managed with Rest, Ice, Compression and Elevation (RICE). Apply ice and firm pressure                 directly to the site that is bleeding and rest with your leg(s) elevated above your heart for 20-30                 minutes.      Post-Op Day One:   1.  Medication   - Ibuprofen and Aspirin: Continue the same as the Day of Your Procedure.   2.  Activity   - We would like you  to get up at least six times and walk around for short periods of time unless it is                 causing you pain. You should be on your feet no more than 90 minutes at a time. Elevate your leg                 above your heart when you are not walking.   3.  Bandages   - Your bandages must be kept on and dry for 48 hours.   4.  Driving   - You may resume driving when you can do so safely. Do not drive if you are taking narcotic pain                 medication.    Post-Op Day Two:     1. Medication   - Ibuprofen and Aspirin: Continue the same as the Day of Your Procedure.   2. Activity   - Walk as tolerated. Elevate as much as possible when not walking.   3. Bandages    - Remove ACE wrap and padding. You may shower. Your doctor may request you wear a stocking                 during the day.   4. Incisions   - Your leg(s) will be bruised; there may be swelling, hard knots under the skin and possibly some                 numbness. These will likely resolve over time. If you see  hair-like  strings coming out of your                 incisions, do not pull them (this will only cause pain/discomfort). We will trim them when you come                 back for your follow-up appointment.   5. Call Us If:   - You see any areas on your leg that are red and angry in appearance.   - You notice any drainage that is milky or cloudy in appearance or that has a foul odor.   - You run a temperature of 100.5 or greater.      Post-Op Day Three: You will have a follow up appointment 2-4 days post procedure. At this appointment, you will have an ultrasound and we will check your incisions.  ________________________________________________________________________________________    The Two Weeks Following Your Procedure   1.  Skin Care   - Do not use any lotions, creams, or powders on your incisions for 14 days or until the incisions have                 healed.   - Do not soak in a bathtub, hot tub or go swimming for 14 days or until  your incisions have healed.   2. Medications   - You may use ibuprofen or Tylenol as needed for pain or discomfort.   3. Activity   - Do not lift over 25 pounds. After about ten days you may resume exercise such as aerobics, running,                 tennis or weightlifting. Use your common sense and ease back into your exercise routine slowly.   - You may feel a cord-like tightness along the inside of your leg. Gentle stretching can be helpful.  4. Travel  - Do not fly in an airplane for 14 days after your procedure. If you have a long car trip planned within                 two to three weeks following your procedure, stop and walk for a few minutes every two hours.                 Periodic ankle pumps during the ride may be helpful.     Six Week Appointment   - At your six-week appointment, you will see your surgeon for an exam and evaluation. This office visit                 will be scheduled when you return for post-op day three return appointment.       Return to Work   1.  If you work outside the home, you may return to work in a few days depending on the extent of your procedure, how you tolerate it, and the type of work you perform.   2.  Paperwork: If your employer requires paperwork or you would like a letter written to your employer, please let us know. We will complete disability type forms at no charge. Please allow five business days for    forms to be completed.         Sclerotherapy: Pre-Treatment Instructions    Recommended Sessions:  __1____ treatment sessions    Pricing: Full session - $641.00 with ultrasound                 *Payment is due prior to your procedure date    Time Required per Treatment Session - About 45 minutes  Please come in 15 minutes before your scheduled appointment.  30 min.  Sclerotherapy treatments last approximately 30 minutes.  5 min.    A staff member will wipe your legs off with warm water and dry them with a wash cloth.                 Then you can put your compression  hose on, get dressed and check out.  10 min.  After your treatment, you will be asked to walk around for 10 minutes before you get in your car.    Medications  Five days before your appointment, discontinue aspirin (Bufferin, Anacin, etc.) and Ibuprofen (Motrin, Advil, Aleve, etc.) to reduce bruising. Resume these medications the day following the treatment.    Leg Preparation  Do not shave your legs or apply any oil, lotion or powder the night before or the day of your treatment.    Clothing  Shorts:  Bring a pair of loose, comfortable shorts to wear during your treatment (or you can choose to wear ours). Shoes: Bring comfortable shoes to accommodate the compression hose after your treatment. Do not wear flip-flops or thong-style sandals unless you have open-toe compression hose.    Photographs  Photos will be taken before each treatment. This helps monitor your progress.    Injections  The physician will inject your veins with the sclerotherapy solution chosen to meet your specific needs.    Compression Hose  Please bring your compression hose if you have them. They may also be reserved for you at our clinic. Compression hose must be worn immediately after each sclerotherapy treatment.  The hose must be compression level 20-30, and they must be worn for 24 hours straight after your treatment.  If you have never worn compression hose before, a staff member will teach you how to put them on.             You cannot have a treatment without compression hose.               They are critical to the success of your treatment!    You may purchase your compression hose from us. We will measure you and have the hose available when you come for your treatment.    Cancellation and Rescheduling  If you need to cancel or reschedule your sclerotherapy treatment, please give our office at least 24 hour notice.    Sclerotherapy: Basic Information    What is sclerotherapy?  Sclerotherapy is a treatment for  spider  veins.  Spider   veins are small veins just under your skin that can look red, blue or purple. Most  spider  veins are only a cosmetic problem.  Spider  veins are not useful and treating them will not affect your circulation.    How does sclerotherapy work?  1.  Injections: A very small needle is used to inject a solution into the  spider  veins. The solution irritates the cells that line the vein walls. This causes the veins to collapse. The vein walls to stick together and they can no longer carry blood. Different solutions are used based on the size of the veins.  2.  Compression:  The spider veins are kept collapsed by wearing compression stockings. Your body will break down and absorb the treated veins. You wear the compression hose for 24 hours after the treatment and then for 4 more days during your waking hours only.    How does the body heal after sclerotherapy?  The process is similar to how your body heals after a bad bruise. It takes 4-6 weeks or more for the healing to be complete. When the healing is complete, the vein is no longer visible. It may take more than one treatment.    How do I get the best results?  It is important to follow the post-sclerotherapy instructions. The best results require time and patience. The injection sites will continue to heal and fade for months after a treatment. Please discuss your expectations with your doctor to keep them realistic. Your doctor will do everything possible to meet or exceed your expectations.    How many treatments are needed?  After your initial exam, your doctor will give you an estimate of the number of treatments that may be required. It depends upon the size, type, and quantity of your  spider  veins and on the doctor's assessment, your history and expectations. You may end up needing fewer or more treatments.    How soon can I have another treatment?  Additional treatments are scheduled every 4-6 weeks to allow time for the body to respond to the previous  treatment.    Common Side Effects:  Itching  The areas that were injected may itch. This is usually mild and lasts less than a day. Do not use lotions or creams on your legs until the injection sites have healed over.    Pain  It is common to have some tenderness at the injection sites. Injection of the solution can be uncomfortable, but is usually well tolerated by most patients. The tenderness is temporary, lasting 24 hours at most. Tylenol or Ibuprofen can be used, if needed, following the product directions.    Bruising  This may occur at the injections sites. Bruising may be minimized by avoiding Aspirin and Ibuprofen products for five days before each treatment session.    Hyperpigmentation  A light brown discoloration of the skin may develop along the veins in the areas injected. Approximately 20-30% of patients treated note the discoloration (which is lighter and less obvious than the veins that are being treated). The hyperpigmentation usually fades in a couple of weeks, but may take several months to a year to totally resolve. There is a 1% chance of hyperpigmentation continuing after one year.    Trapped blood  A small amount of blood may become trapped and hardened in the veins. This may feel like a knot or cord and it may look dark blue or bruised. This is a common occurrence. You may need to return before your next treatment so this area can be drained to remove the trapped blood. This will reduce the hyperpigmentation that can occur. The chance of this occurring can be decreased with proper use of compression hose after your treatment.    Matting  Matting is the formation of new, fine  spider  veins in the area injected.  It occurs in approximately 10% of patients injected. The exact reason for this is unknown. If untreated, the matting usually resolves in 3 to 12 months, but very rarely, it can be permanent. If the matting does not fade, it can be re-injected.    Rare Side Effects:  Ulceration at  injection sites  Very rarely, a small ulcer will occur at the site where a vein is injected. An ulcer can take 4 to 6 weeks to completely heal. A small scar may result.    Allergic reactions  There is a very rare incidence of an allergic reaction to the solution injected. You will be observed for such reaction and will be treated appropriately should it occur. Please inform us of any allergy history.    Pulmonary embolus/Deep vein thrombosis  This is a blood clot which moves to the lungs/a blood clot in the deep vein system. There is an extraordinarily low incidence of this complication.      SCLEROTHERAPY AFTER CARE  Immediately:  After treatment, walk for 10-15 minutes before getting in your car.  If your trip home is more than 1 hour, stop and walk around for 5-10 minutes. Avoid sitting or standing for extended periods.   First 24 hours: Wear your compression continuously, even while in bed. After the 24 hours, you may shower if you want to. Put your hose back on, unless you are going to bed. You should NOT wear compression to bed after the first 24 hours. You may fly the next day, but wear your compression.   For 5 days: Wear the compression hose for waking hours only. You may continue to wear them longer than 5 days if you prefer.   For days 5-7: Walking is encouraged, as it promotes efficient circulation in your veins. You may do activities that raise your heart rate, but do NOT run, jog, do high impact aerobics, or weight lifting. After 7 days, no activity restrictions.  Shaving: Wait a few days to shave or apply lotion.   Bathing: Do NOT take hot baths or sit in a hot tub for 7-10 days.    For 1 year: Wear SPF 30 sunscreen on your legs when in the sun. This is very important! It helps prevent darkening of the skin at the injection sites.   Medications: You may resume your usual medications, including aspirin or ibuprofen.    Common Things to Expect       Compression must be worn for the first 24 hours and  then during the day for 5-7 days.    If larger veins are treated with ultrasound-guided sclerotherapy, you will have redness, firmness, tenderness, and swelling.  This firmness and tenderness may take 3-6 months to resolve. Ibuprofen and compression hose will aid in this process.    You will have bruising that can last up to 3 weeks. Most fading of the veins will occur between 3 and 6 weeks after treatment.    You may notice brown discoloration (hyperpigmentation) at the treatment site.  This should fade with time, but will take 3 months to 1 year to fully heal.     Some treated veins may look darker because of trapped blood within the vein. This trapped blood can be removed at a minimum of 1 month following treatment. Larger veins are more likely to develop trapped blood.    It is very important for you to use at least SPF 30 sunscreen in order to help prevent the discoloration of your skin.    Migraines rarely occur following sclerotherapy, but are more likely in patients with a history of migraines.  Treat as you would any other migraine.      Thigh High, medical grade, 20-30 mmHg strength, compression stockings are recommended (may be required if having a vein procedure or treatment)    Options for purchasing compression stockings:    Call your insurance company and ask if compression stockings are covered.  If they are covered, they usually fall under your Durable Medical Equipment (DME) coverage.  The DME codes if they ask are: Knee high , Thigh high , Panty .   Be sure to ask if you need a specific medical diagnosis for coverage, if your deductible needs to be met first, how many pairs are covered and how often you can get them.  If insurance covers them and you would like to order from [facility], or another medical supply company: contact the vein clinic and we will fax a prescription to your medical supply company of choice. They will bill your insurance and ship them to you.  Please let us know your color choice (black or beige), and toe choice (open or closed toe) when contacting us.    2.  We sell regular sizes of [employer] in our clinic (except specialty order or petite sizing). Ask us to check if we have your size. We cannot bill your insurance for these purchases.  Knee high are $65/pair  Thigh high are $90/pair.   If you would like to purchase from the clinic, let us know including your color choice (black or beige), and toe choice (open or closed toe). We will set them aside for you to  and pay for at your next clinic appointment or the day of your procedure.    3.  Online website ordering options:   [email or web address]  [email or web address] has many options available.      Measurements:   Left Ankle: 22.5cm   Left Calf: 38.5cm   Left Thigh: 59.5cm     Leg Length: 78cm   Calf Length: 46cm

## 2025-02-10 NOTE — PROGRESS NOTES
February 10, 2025    Vein Procedure Recommendation    Spoke with patient in clinic.    Dr. Corley has recommended patient to have the following vein procedure(s):     1. Left leg Venaseal ASV, 10-20 Phlebectomies (medically necessary), and Ultrasound Guided Sclerotherapy (medically necessary). Patient is aware that phlebectomies and sclerotherapy may not be covered by insurance. Phlebectomy self pay: $540.00, Ultrasound guided sclerotherapy self pay: $641.00    2. Right leg Venaseal GSV      Patient Pre-op Questions:  Preferred Pharmacy: [facility]  Anticoagulant/ASA: No  Artificial Joint or Heart Valve:  No  Open ulcer:  No  Sedation nausea: No      Patient is recommended to wear Thigh High compression hose following her procedure. Discussed compression hose. Explained to patient if insurance doesn't cover the compression hose there are a couple different options to getting them and to call the clinic to let us know if they need help. Patient says she has some thigh high hose.    Handed patient written procedure instructions to review on her own (see After Visit Summary).    Next steps:    Insurance Submission  Informed patient this process could take up to 14 business days, but once approved, the patient will be contacted by our surgery scheduler to schedule the above procedure. Gave patient our surgery scheduler's information.    Informed patient to call our office regarding the status of their insurance authorization if it has been more than 3 weeks and have not heard from our surgery scheduler.    Patient is in agreement with all of the above and has no further questions at this time.    Guadalupe Chu RN  [facility]  Vein Clinic

## 2025-02-10 NOTE — LETTER
2/10/2025      Lindsay Burch  [address] [place] [address]      Dear Colleague,    Thank you for referring your patient, Lindsay Burch, to the [facility] [place]. Please see a copy of my visit note below.    Virtual Visit Details    Type of service:  Video Visit     Originating Location (pt. Location): Home  Distant Location (provider location):  On-site  Platform used for Video Visit: [facility]            Vein Clinic Consultation Note    HPI:  Lindsay Burch is a 39 year old female who was seen today in consultation for bilateral lower extremity heaviness and achiness with left lateral varicose vein pain.  She requires frequent breaks to elevate legs and stop exercise due to the pain left greater than right.  She has tried compression stockings for the past 4 years but the symptoms have progressed.  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      ROS    PHH:    Past Medical History:   Diagnosis Date     Depressive disorder 2016    After 1st pregnancy     Thyroid disease 2015    During 1st pregnancy        Past Surgical History:   Procedure Laterality Date     OPERATIVE HYSTEROSCOPY WITH MORCELLATOR  1/2/2014    Procedure: OPERATIVE HYSTEROSCOPY WITH MORCELLATOR;  OPERATIVE HYSTEROSCOPY, RESECTION OF ENDOMETRIAL POLYP;  Surgeon: Elizabeth Stover MD;  Location: [facility]     wisdom teeth[         ALLERGIES:  Patient has no known allergies.    No Known Allergies    MEDS:    Current Outpatient Medications:      levothyroxine (SYNTHROID, LEVOTHROID) 88 MCG tablet, Take 1 tablet (88 mcg) by mouth daily, Disp: 90 tablet, Rfl: 0     liothyronine (CYTOMEL) 5 MCG tablet, Take 1 tablet by mouth every evening, Disp: , Rfl:      Multiple Vitamins-Minerals (MULTIVITAMIN ADULTS PO), Take 1 tablet by mouth At Bedtime, Disp: , Rfl:      Omega-3 Fatty Acids (OMEGA-3 FISH OIL PO), Take by mouth daily,  Disp: , Rfl:      VITAMIN D, CHOLECALCIFEROL, PO, Take 5,000 Units by mouth daily, Disp: , Rfl:      XIIDRA 5 % opthalmic solution, INSTILL 1 DROP TWICE DAILY IN BOTH EYES FOR DRY EYE SYNDROME INDEFINITELY., Disp: , Rfl:     SOCIAL HABITS:    History   Smoking Status     Never   Smokeless Tobacco     Never     Social History    Substance and Sexual Activity      Alcohol use: Yes        Alcohol/week: 0.0 standard drinks of alcohol        Comment: occasional      History   Drug Use No       FAMILY HISTORY:    Family History   Problem Relation Age of Onset     Hypertension Mother      Hypertension Father      Anxiety Disorder Sister      Results for orders placed or performed in visit on 01/29/25   US Venous Competency Bilateral    Narrative    Table formatting from the original result was not included.  Bilateral Venous Insufficiency Ultrasound (Date: 01/29/25)  Bilateral Lower Extremity  Examined by:  JEANETTE Ortega RVT  Indication: varicose veins with pain    BP:  126/80  HR: 61    Technique:   Supine and Reverse Trendelenburg Ultrasound of the Deep and Superficial   Veins with Valsalva and Compression Augmentation Maneuvers. Duplex Imaging   is performed utilizing gray-scale, Two-dimensional images, color-flow   imaging, Doppler waveform analysis, and Spectral doppler imaging done with   provocative maneuvers.     Incompetency Criteria:  Deep vein reflux reported when greater than 1.0 sec flow reversal.   Superficial vein reflux reported when greater than 0.5 sec flow reversal.    vein reflux reported as greater than 0.5 sec flow reversal.    Scan Position for Deep and Superficial Insufficiency Evaluation: Reverse   Trendelenburg    Right  Leg Deep Veins   Thrombus Phasic Reflux Time (sec)   CFV        -       +         2.3   Femoral V Prox        -       +         5.0   Femoral V Mid         -       +         2.2   Femoral V Dist        -       +         -   Popliteal V        -       +         -   PTV'S         -     Peroneal V'S        -       Right Leg Superficial Veins   AP (mm) Depth (mm) Thrombus Reflux Time (sec)   SFJ      9.3      4.5         -       5.1   AASV @ SFJ      NV      -         -       -   PASV Prox Thigh      4.7      8.0         -       -          GSV Prox Thigh      6.6      10.6         -       1.8   GSV Mid Thigh      6.5      9.4         -       5.4   GSV Dist Thigh      5.6      11.0         -       -   GSV Knee      9.5      8.3         -       4.4   GSV Prox Calf      4.9      9.0         -       3.5   GSV Mid Calf      3.0      8.6         -       -   GSV Dist Calf      2.7      6.6         -       -      AP (mm) Depth (mm) Thrombus Reflux Time (sec)   GIACOMINI V Dist     2.6      11.8        -      4.1   GIACOMINI V Mid     2.3      8.9        -      -   GIACOMINI V Prox     3.3      10.        -      -          SPJ      NV                       SSV Prox Calf      1.8      9.6        -      -   SSV Mid Calf      1.8      10.7        -      -   SSV Dist Calf      2.0      5.1        -      1.3     Comments:   The GSV gives rise to multiple incompetent varicose veins, the largest   measures 6.9 mm off the Proximal Calf that courses toward the ankle  with   a reflux time of 4.7 seconds.     Perforators: There is an incompetent  vein ( 7.1 mm) at medial   calf approximately 3 cm from knee crease that communicates with GSV/VV and   PTVs.     Left  Leg Deep Veins   Thrombus Phasic Reflux Time (sec)   CFV        -       +         5.8   Femoral V Prox        -       +         3.2   Femoral V Mid         -       +         -   Femoral V Dist        -       +         -   Popliteal V        -       +         -   PTV'S        -     Peroneal V'S        -       Left Leg Superficial Veins   AP (mm) Depth (mm) Thrombus Reflux Time (sec)   SFJ      7.0      2.6        -       -   PASV      5.0      8.8         -       3.0   AASV @ SFJ      5.7      6.6        -       4.7   AASV Prox Thigh      5.3       9.4        -       4.4          GSV Prox Thigh      4.5      11.2        -       -    GSV Mid Thigh      4.6      10.7        -        -    GSV Dist Thigh      4.8      12.4        -       -   GSV Knee      4.6      10.7        -       -   GSV Prox Calf      3.2      9.8        -       -   GSV Mid Calf      2.7      10.8        -       0.6   GSV Dist Calf      2.0      7.1        -       0.6      AP (mm) Depth (mm) Thrombus Reflux Time (sec)   GIACOMINI V Dist      1.9      12.9         -       0.8   GIACOMINI V Mid      3.5      12.8         -       0.8   GIACOMINI V Prox      3.1      12.3         -       -          SPJ      NV       -         -       -   SSV Prox Calf      3.5       8.6         -       -   SSV Mid Calf      2.5       7.2         -       -   SSV Dist Calf      1.7       6.0         -       -     Comments:   The AASV gives rise to a varicose branch measuring 8.1 mm off the Proximal   Thigh that courses  Lateral and toward the ankle with a reflux time of 2.5   seconds.      ASV courses straight for 5 cm from the SFJ and then becomes varicose.    The GSV gives rise to a varicose branch measuring 4.4 mm off the Proximal   Calf that courses  Posterior with a reflux time of 1.4 seconds.      Perforators: There is no evidence of incompetent  veins at any   level.     Impression:      No DVT bilaterally.  Right deep vein reflux.  Severe right greater   saphenous vein reflux 6.5 mm, 5.4 seconds with associated severely   refluxing varicose veins.  Incompetent calf  measuring 7.3 mm   with associated refluxing varicose veins 6.9 mm, 4.7 seconds.    Left femoral deep vein reflux.  Severely refluxing left anterior accessory   saphenous vein measuring 5.7 mm, 4.7 seconds reflux with associated   severely refluxing thigh varicose vein 8.1 mm, 2.5 seconds of reflux.          Reference:    Venous Doppler: (+) = Present  (-) = Absent  (D) = Decreased, (NV) = Not   Visualized    Reflux:  (-)  Competent, (NV) = Not Visualized    Interpretation criteria:  Duration of Retrograde flow (milliseconds)  Category Deep Veins Superficial Veins  veins   Competent < 1.0 s < 0.5 s < 0.5 s   Incompetent > 1.0 s > 0.5 s > 0.5 s             VEIN CLINIC LEG DRAWING    No data recorded     ASSESSMENT: Symptomatic bilateral venous reflux with leg heaviness, achiness, throbbing and pain left greater than right that requires frequent breaks from exercise due to the pain and limits her ability to ambulate and stand because of the discomfort and the need for breaks to elevate her legs.  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  Venous competency testing confirms right greater saphenous vein reflux 5.4 seconds, 9.4 mm and left AASV reflux 4.4 seconds, 5.3 mm.  She has severely refluxing varicose veins off both the right greater saphenous vein and left anterior accessory saphenous vein.  She has failed conservative management with compression stockings, exercise and elevation.    PLAN: Right greater saphenous vein VenaSeal closure and left anterior accessory saphenous vein VenaSeal closure.  She would like sclerotherapy and phlebectomy of the anterior accessory saphenous vein which I think is appropriate.  Insurance would recommend this be done in a staged fashion.  The patient would prefer to have this done at the time of anterior accessory saphenous vein closure to reduce the risk of thrombosis and stasis.  She will likely self pay the sclerotherapy and phlebectomy if not approved by insurance.      Ángel Corley MD    30 minutes spent today reviewing chart, discussing with patient and postvisit charting.      Again, thank you for allowing me to participate in the care of your patient.        Sincerely,        Ángel Corley MD    Electronically signed

## 2025-02-10 NOTE — PROGRESS NOTES
Virtual Visit Details    Type of service:  Video Visit     Originating Location (pt. Location): Home  Distant Location (provider location):  On-site  Platform used for Video Visit: [facility]

## 2025-02-10 NOTE — PROGRESS NOTES
Vein Clinic Consultation Note    HPI:  Lindsay Burch is a 39 year old female who was seen today in consultation for bilateral lower extremity heaviness and achiness with left lateral varicose vein pain.  She requires frequent breaks to elevate legs and stop exercise due to the pain left greater than right.  She has tried compression stockings for the past 4 years but the symptoms have progressed.  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      ROS    PHH:    Past Medical History:   Diagnosis Date    Depressive disorder 2016    After 1st pregnancy    Thyroid disease 2015    During 1st pregnancy        Past Surgical History:   Procedure Laterality Date    OPERATIVE HYSTEROSCOPY WITH MORCELLATOR  1/2/2014    Procedure: OPERATIVE HYSTEROSCOPY WITH MORCELLATOR;  OPERATIVE HYSTEROSCOPY, RESECTION OF ENDOMETRIAL POLYP;  Surgeon: Elizabeth Stover MD;  Location: [facility]    wisdom teeth[         ALLERGIES:  Patient has no known allergies.    No Known Allergies    MEDS:    Current Outpatient Medications:     levothyroxine (SYNTHROID, LEVOTHROID) 88 MCG tablet, Take 1 tablet (88 mcg) by mouth daily, Disp: 90 tablet, Rfl: 0    liothyronine (CYTOMEL) 5 MCG tablet, Take 1 tablet by mouth every evening, Disp: , Rfl:     Multiple Vitamins-Minerals (MULTIVITAMIN ADULTS PO), Take 1 tablet by mouth At Bedtime, Disp: , Rfl:     Omega-3 Fatty Acids (OMEGA-3 FISH OIL PO), Take by mouth daily, Disp: , Rfl:     VITAMIN D, CHOLECALCIFEROL, PO, Take 5,000 Units by mouth daily, Disp: , Rfl:     XIIDRA 5 % opthalmic solution, INSTILL 1 DROP TWICE DAILY IN BOTH EYES FOR DRY EYE SYNDROME INDEFINITELY., Disp: , Rfl:     SOCIAL HABITS:    History   Smoking Status    Never   Smokeless Tobacco    Never     Social History    Substance and Sexual Activity      Alcohol use: Yes        Alcohol/week: 0.0 standard drinks of alcohol        Comment: occasional       History   Drug Use No       FAMILY HISTORY:    Family History   Problem Relation Age of Onset    Hypertension Mother     Hypertension Father     Anxiety Disorder Sister      Results for orders placed or performed in visit on 01/29/25   US Venous Competency Bilateral    Narrative    Table formatting from the original result was not included.  Bilateral Venous Insufficiency Ultrasound (Date: 01/29/25)  Bilateral Lower Extremity  Examined by:  JEANETTE Ortega RVT  Indication: varicose veins with pain    BP:  126/80  HR: 61    Technique:   Supine and Reverse Trendelenburg Ultrasound of the Deep and Superficial   Veins with Valsalva and Compression Augmentation Maneuvers. Duplex Imaging   is performed utilizing gray-scale, Two-dimensional images, color-flow   imaging, Doppler waveform analysis, and Spectral doppler imaging done with   provocative maneuvers.     Incompetency Criteria:  Deep vein reflux reported when greater than 1.0 sec flow reversal.   Superficial vein reflux reported when greater than 0.5 sec flow reversal.    vein reflux reported as greater than 0.5 sec flow reversal.    Scan Position for Deep and Superficial Insufficiency Evaluation: Reverse   Trendelenburg    Right  Leg Deep Veins   Thrombus Phasic Reflux Time (sec)   CFV        -       +         2.3   Femoral V Prox        -       +         5.0   Femoral V Mid         -       +         2.2   Femoral V Dist        -       +         -   Popliteal V        -       +         -   PTV'S        -     Peroneal V'S        -       Right Leg Superficial Veins   AP (mm) Depth (mm) Thrombus Reflux Time (sec)   SFJ      9.3      4.5         -       5.1   AASV @ SFJ      NV      -         -       -   PASV Prox Thigh      4.7      8.0         -       -          GSV Prox Thigh      6.6      10.6         -       1.8   GSV Mid Thigh      6.5      9.4         -       5.4   GSV Dist Thigh      5.6      11.0         -       -   GSV Knee      9.5      8.3         -        4.4   GSV Prox Calf      4.9      9.0         -       3.5   GSV Mid Calf      3.0      8.6         -       -   GSV Dist Calf      2.7      6.6         -       -      AP (mm) Depth (mm) Thrombus Reflux Time (sec)   GIACOMINI V Dist     2.6      11.8        -      4.1   GIACOMINI V Mid     2.3      8.9        -      -   GIACOMINI V Prox     3.3      10.        -      -          SPJ      NV                       SSV Prox Calf      1.8      9.6        -      -   SSV Mid Calf      1.8      10.7        -      -   SSV Dist Calf      2.0      5.1        -      1.3     Comments:   The GSV gives rise to multiple incompetent varicose veins, the largest   measures 6.9 mm off the Proximal Calf that courses toward the ankle  with   a reflux time of 4.7 seconds.     Perforators: There is an incompetent  vein ( 7.1 mm) at medial   calf approximately 3 cm from knee crease that communicates with GSV/VV and   PTVs.     Left  Leg Deep Veins   Thrombus Phasic Reflux Time (sec)   CFV        -       +         5.8   Femoral V Prox        -       +         3.2   Femoral V Mid         -       +         -   Femoral V Dist        -       +         -   Popliteal V        -       +         -   PTV'S        -     Peroneal V'S        -       Left Leg Superficial Veins   AP (mm) Depth (mm) Thrombus Reflux Time (sec)   SFJ      7.0      2.6        -       -   PASV      5.0      8.8         -       3.0   AASV @ SFJ      5.7      6.6        -       4.7   AASV Prox Thigh      5.3      9.4        -       4.4          GSV Prox Thigh      4.5      11.2        -       -    GSV Mid Thigh      4.6      10.7        -        -    GSV Dist Thigh      4.8      12.4        -       -   GSV Knee      4.6      10.7        -       -   GSV Prox Calf      3.2      9.8        -       -   GSV Mid Calf      2.7      10.8        -       0.6   GSV Dist Calf      2.0      7.1        -       0.6      AP (mm) Depth (mm) Thrombus Reflux Time (sec)   GIACOMINI V  Dist      1.9      12.9         -       0.8   GIACOMINI V Mid      3.5      12.8         -       0.8   GIACOMINI V Prox      3.1      12.3         -       -          SPJ      NV       -         -       -   SSV Prox Calf      3.5       8.6         -       -   SSV Mid Calf      2.5       7.2         -       -   SSV Dist Calf      1.7       6.0         -       -     Comments:   The AASV gives rise to a varicose branch measuring 8.1 mm off the Proximal   Thigh that courses  Lateral and toward the ankle with a reflux time of 2.5   seconds.      ASV courses straight for 5 cm from the SFJ and then becomes varicose.    The GSV gives rise to a varicose branch measuring 4.4 mm off the Proximal   Calf that courses  Posterior with a reflux time of 1.4 seconds.      Perforators: There is no evidence of incompetent  veins at any   level.     Impression:      No DVT bilaterally.  Right deep vein reflux.  Severe right greater   saphenous vein reflux 6.5 mm, 5.4 seconds with associated severely   refluxing varicose veins.  Incompetent calf  measuring 7.3 mm   with associated refluxing varicose veins 6.9 mm, 4.7 seconds.    Left femoral deep vein reflux.  Severely refluxing left anterior accessory   saphenous vein measuring 5.7 mm, 4.7 seconds reflux with associated   severely refluxing thigh varicose vein 8.1 mm, 2.5 seconds of reflux.          Reference:    Venous Doppler: (+) = Present  (-) = Absent  (D) = Decreased, (NV) = Not   Visualized    Reflux:  (-) Competent, (NV) = Not Visualized    Interpretation criteria:  Duration of Retrograde flow (milliseconds)  Category Deep Veins Superficial Veins  veins   Competent < 1.0 s < 0.5 s < 0.5 s   Incompetent > 1.0 s > 0.5 s > 0.5 s             VEIN CLINIC LEG DRAWING    No data recorded     ASSESSMENT: Symptomatic bilateral venous reflux with leg heaviness, achiness, throbbing and pain left greater than right that requires frequent breaks from exercise due  to the pain and limits her ability to ambulate and stand because of the discomfort and the need for breaks to elevate her legs.  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  Venous competency testing confirms right greater saphenous vein reflux 5.4 seconds, 9.4 mm and left AASV reflux 4.4 seconds, 5.3 mm.  She has severely refluxing varicose veins off both the right greater saphenous vein and left anterior accessory saphenous vein.  She has failed conservative management with compression stockings, exercise and elevation.    PLAN: Right greater saphenous vein VenaSeal closure and left anterior accessory saphenous vein VenaSeal closure.  The left upper thigh and the lateral thigh varicose veins associated with the anterior accessory saphenous vein are most painful and problematic for her.  Would like to perform sclerotherapy and phlebectomy of left upper thigh, lateral thigh and lateral posterior calf to aid healing and prevent superficial thrombophlebitis with a closure of her anterior accessory saphenous vein.      Ángel Corley MD    30 minutes spent today reviewing chart, discussing with patient and postvisit charting.

## 2025-02-19 DIAGNOSIS — I83.813 VARICOSE VEINS OF BOTH LOWER EXTREMITIES WITH PAIN: Primary | ICD-10-CM

## 2025-03-03 ENCOUNTER — OFFICE VISIT (OUTPATIENT)
Dept: INTERNAL MEDICINE | Facility: CLINIC | Age: 40
End: 2025-03-03
Payer: COMMERCIAL

## 2025-03-03 VITALS
DIASTOLIC BLOOD PRESSURE: 89 MMHG | TEMPERATURE: 97.1 F | HEART RATE: 71 BPM | BODY MASS INDEX: 22.65 KG/M2 | RESPIRATION RATE: 11 BRPM | OXYGEN SATURATION: 97 % | WEIGHT: 161.8 LBS | SYSTOLIC BLOOD PRESSURE: 128 MMHG | HEIGHT: 71 IN

## 2025-03-03 DIAGNOSIS — N84.0 ENDOMETRIAL POLYP: ICD-10-CM

## 2025-03-03 DIAGNOSIS — Z12.31 VISIT FOR SCREENING MAMMOGRAM: ICD-10-CM

## 2025-03-03 DIAGNOSIS — E03.9 ACQUIRED HYPOTHYROIDISM: ICD-10-CM

## 2025-03-03 DIAGNOSIS — N92.0 MENORRHAGIA WITH REGULAR CYCLE: ICD-10-CM

## 2025-03-03 DIAGNOSIS — Z01.818 PRE-OP EXAM: Primary | ICD-10-CM

## 2025-03-03 LAB
ANION GAP SERPL CALCULATED.3IONS-SCNC: 10 MMOL/L (ref 7–15)
BASOPHILS # BLD AUTO: 0 10E3/UL (ref 0–0.2)
BASOPHILS NFR BLD AUTO: 1 %
BUN SERPL-MCNC: 15.5 MG/DL (ref 6–20)
CALCIUM SERPL-MCNC: 9.3 MG/DL (ref 8.8–10.4)
CHLORIDE SERPL-SCNC: 103 MMOL/L (ref 98–107)
CREAT SERPL-MCNC: 1.11 MG/DL (ref 0.51–0.95)
EGFRCR SERPLBLD CKD-EPI 2021: 64 ML/MIN/1.73M2
EOSINOPHIL # BLD AUTO: 0.1 10E3/UL (ref 0–0.7)
EOSINOPHIL NFR BLD AUTO: 2 %
ERYTHROCYTE [DISTWIDTH] IN BLOOD BY AUTOMATED COUNT: 11.9 % (ref 10–15)
GLUCOSE SERPL-MCNC: 88 MG/DL (ref 70–99)
HCO3 SERPL-SCNC: 26 MMOL/L (ref 22–29)
HCT VFR BLD AUTO: 41.5 % (ref 35–47)
HGB BLD-MCNC: 14.3 G/DL (ref 11.7–15.7)
IMM GRANULOCYTES # BLD: 0 10E3/UL
IMM GRANULOCYTES NFR BLD: 0 %
IRON BINDING CAPACITY (ROCHE): 288 UG/DL (ref 240–430)
IRON SATN MFR SERPL: 41 % (ref 15–46)
IRON SERPL-MCNC: 119 UG/DL (ref 37–145)
LYMPHOCYTES # BLD AUTO: 1.1 10E3/UL (ref 0.8–5.3)
LYMPHOCYTES NFR BLD AUTO: 20 %
MCH RBC QN AUTO: 29.3 PG (ref 26.5–33)
MCHC RBC AUTO-ENTMCNC: 34.5 G/DL (ref 31.5–36.5)
MCV RBC AUTO: 85 FL (ref 78–100)
MONOCYTES # BLD AUTO: 0.5 10E3/UL (ref 0–1.3)
MONOCYTES NFR BLD AUTO: 10 %
NEUTROPHILS # BLD AUTO: 3.8 10E3/UL (ref 1.6–8.3)
NEUTROPHILS NFR BLD AUTO: 69 %
PLATELET # BLD AUTO: 252 10E3/UL (ref 150–450)
POTASSIUM SERPL-SCNC: 3.8 MMOL/L (ref 3.4–5.3)
RBC # BLD AUTO: 4.88 10E6/UL (ref 3.8–5.2)
SODIUM SERPL-SCNC: 139 MMOL/L (ref 135–145)
TSH SERPL DL<=0.005 MIU/L-ACNC: 1.53 UIU/ML (ref 0.3–4.2)
WBC # BLD AUTO: 5.5 10E3/UL (ref 4–11)

## 2025-03-03 PROCEDURE — 83540 ASSAY OF IRON: CPT | Performed by: INTERNAL MEDICINE

## 2025-03-03 PROCEDURE — 80048 BASIC METABOLIC PNL TOTAL CA: CPT | Performed by: INTERNAL MEDICINE

## 2025-03-03 PROCEDURE — 85025 COMPLETE CBC W/AUTO DIFF WBC: CPT | Performed by: INTERNAL MEDICINE

## 2025-03-03 PROCEDURE — 3074F SYST BP LT 130 MM HG: CPT | Performed by: INTERNAL MEDICINE

## 2025-03-03 PROCEDURE — 3079F DIAST BP 80-89 MM HG: CPT | Performed by: INTERNAL MEDICINE

## 2025-03-03 PROCEDURE — 99214 OFFICE O/P EST MOD 30 MIN: CPT | Performed by: INTERNAL MEDICINE

## 2025-03-03 PROCEDURE — 83550 IRON BINDING TEST: CPT | Performed by: INTERNAL MEDICINE

## 2025-03-03 PROCEDURE — 84443 ASSAY THYROID STIM HORMONE: CPT | Performed by: INTERNAL MEDICINE

## 2025-03-03 PROCEDURE — 36415 COLL VENOUS BLD VENIPUNCTURE: CPT | Performed by: INTERNAL MEDICINE

## 2025-03-03 ASSESSMENT — PATIENT HEALTH QUESTIONNAIRE - PHQ9
10. IF YOU CHECKED OFF ANY PROBLEMS, HOW DIFFICULT HAVE THESE PROBLEMS MADE IT FOR YOU TO DO YOUR WORK, TAKE CARE OF THINGS AT HOME, OR GET ALONG WITH OTHER PEOPLE: NOT DIFFICULT AT ALL
SUM OF ALL RESPONSES TO PHQ QUESTIONS 1-9: 1
SUM OF ALL RESPONSES TO PHQ QUESTIONS 1-9: 1

## 2025-03-03 NOTE — H&P (VIEW-ONLY)
Preoperative Evaluation  [facility]  [address]  [place] [address]  Phone: [phone]  Fax: [phone]  Primary Provider: Shruti Lim NP  Pre-op Performing Provider: Katina Humphries MD  Mar 3, 2025             3/3/2025   Surgical Information   What procedure is being done? hysterecomy   Facility or [facility] where procedure/surgery will be performed: [facility]   Who is doing the procedure / surgery? Yaneth Morelos   Date of surgery / procedure: 03/13/2025   Time of surgery / procedure: 9:35am   Where do you plan to recover after surgery? at home with family     Fax number for surgical facility: Note does not need to be faxed, will be available electronically in Epic.    Assessment & Plan     The proposed surgical procedure is considered INTERMEDIATE risk.    Pre-op exam  Patient is medically stable for this medium risk surgery, will do preop labs.  Discussed not to take any vitamins NSAIDs or aspirin 1 week prior to surgery.  - CBC with platelets and differential  - Iron and iron binding capacity  - Basic metabolic panel  (Ca, Cl, CO2, Creat, Gluc, K, Na, BUN)    Endometrial polyp and dysfunctional uterine bleeding  She has recurrent endometrial polyps and due to heavy menstrual cycle and intermittent spotting, currently elected to have hysterectomy done.  - CBC with platelets and differential  - Iron and iron binding capacity  - TSH with free T4 reflex  - Basic metabolic panel  (Ca, Cl, CO2, Creat, Gluc, K, Na, BUN)    Menorrhagia with regular cycle  - CBC with platelets and differential  - Iron and iron binding capacity  - TSH with free T4 reflex  - Basic metabolic panel  (Ca, Cl, CO2, Creat, Gluc, K, Na, BUN)    Visit for screening mammogram  Exam is normal today  - MA Screening Bilateral w/ Joss; Future    Acquired hypothyroidism  She is on Synthroid, will check her levels today, discussed to take it with a small sip of  water morning of surgery  - TSH with free T4 reflex    Recommendation  Approval given to proceed with proposed procedure, without further diagnostic evaluation.    Subjective   Lindsay is a 39 year old, presenting for the following:  Pre-Op Exam (Pre-op for a ROBOTIC ASSIST TOTAL LAPAROSCOPIC HYSTERECTOMY BILATERAL SALPINGECTOMY CYSTOSCOPY at [facility] on 03/13/2025.)          3/3/2025    10:43 AM   Additional Questions   Roomed by Doris   Accompanied by Milagros     HPI:     Lindsay is a pleasant 39-year-old female with history of anxiety and endometrial and cervical polyps as well as menorrhagia.  Currently due to recurrent endometrial polyps and persistent vaginal spotting she is currently here for preoperative evaluation for laparoscopic hysterectomy.    Previous surgical procedures significant for D&C with hysteroscopy.  She denies any anesthesia side effects, no bleeding or clotting history.  No family history of such.    She exercises 30 minutes a day 6 days a week and denies any chest pains palpitations or shortness of breath, no history of smoking or family history of coronary artery disease.    No history of asthma, smoking, snoring or recent upper respiratory infections.    Her  has a vasectomy, she has not been sexually active in the last month due to persistent spotting, previous pregnancy testing done at her gynecologist office 2 to 3 weeks ago was negative.          3/3/2025   Pre-Op Questionnaire   Have you ever had a heart attack or stroke? No   Have you ever had surgery on your heart or blood vessels, such as a stent placement, a coronary artery bypass, or surgery on an artery in your head, neck, heart, or legs? No   Do you have chest pain with activity? No   Do you have a history of heart failure? No   Do you currently have a cold, bronchitis or symptoms of other infection? No   Do you have a cough, shortness of breath, or wheezing? No   Do you or anyone in your  family have previous history of blood clots? No   Do you or does anyone in your family have a serious bleeding problem such as prolonged bleeding following surgeries or cuts? No   Have you ever had problems with anemia or been told to take iron pills? No   Have you had any abnormal blood loss such as black, tarry or bloody stools, or abnormal vaginal bleeding? No   Have you ever had a blood transfusion? No   Are you willing to have a blood transfusion if it is medically needed before, during, or after your surgery? Yes   Have you or any of your relatives ever had problems with anesthesia? No   Do you have sleep apnea, excessive snoring or daytime drowsiness? No   Do you have any artifical heart valves or other implanted medical devices like a pacemaker, defibrillator, or continuous glucose monitor? No   Do you have artificial joints? No   Are you allergic to latex? No       Patient Active Problem List    Diagnosis Date Noted    Anxiety 03/10/2016     Priority: Medium    Dysthymia 03/10/2016     Priority: Medium    Hypothyroidism, unspecified hypothyroidism type 03/10/2016     Priority: Medium      Past Medical History:   Diagnosis Date    Depressive disorder 2016    After 1st pregnancy    Thyroid disease 2015    During 1st pregnancy     Past Surgical History:   Procedure Laterality Date    OPERATIVE HYSTEROSCOPY WITH MORCELLATOR  1/2/2014    Procedure: OPERATIVE HYSTEROSCOPY WITH MORCELLATOR;  OPERATIVE HYSTEROSCOPY, RESECTION OF ENDOMETRIAL POLYP;  Surgeon: Elizabeth Stover MD;  Location: [facility]    wisdom teeth[       Current Outpatient Medications   Medication Sig Dispense Refill    levothyroxine (SYNTHROID, LEVOTHROID) 88 MCG tablet Take 1 tablet (88 mcg) by mouth daily 90 tablet 0    liothyronine (CYTOMEL) 5 MCG tablet Take 1 tablet by mouth every evening      Multiple Vitamins-Minerals (MULTIVITAMIN ADULTS PO) Take 1 tablet by mouth At Bedtime      Omega-3 Fatty Acids (OMEGA-3 FISH OIL PO) Take by mouth daily   "    VITAMIN D, CHOLECALCIFEROL, PO Take 5,000 Units by mouth daily      XIIDRA 5 % opthalmic solution INSTILL 1 DROP TWICE DAILY IN BOTH EYES FOR DRY EYE SYNDROME INDEFINITELY.         No Known Allergies     Social History     Tobacco Use    Smoking status: Never    Smokeless tobacco: Never   Substance Use Topics    Alcohol use: Yes     Alcohol/week: 0.0 standard drinks of alcohol     Comment: occasional     Family History   Problem Relation Age of Onset    Hypertension Mother     Hypertension Father     Anxiety Disorder Sister      History   Drug Use No           Review of systems: As above she denies any constipation diarrhea, no dysuria    Objective    /89 (BP Location: Left arm, Patient Position: Sitting, Cuff Size: Adult Regular)   Pulse 71   Temp 97.1  F (36.2  C) (Oral)   Resp 11   Ht 1.795 m (5' 10.67\")   Wt 73.4 kg (161 lb 12.8 oz)   SpO2 97%   BMI 22.78 kg/m     Estimated body mass index is 22.78 kg/m  as calculated from the following:    Height as of this encounter: 1.795 m (5' 10.67\").    Weight as of this encounter: 73.4 kg (161 lb 12.8 oz).  Physical Exam  General: well appearing female, alert and oriented x3  EYES: Eyelids, conjunctiva, and sclera were normal. Pupils were normal.   HEAD, EARS, NOSE, MOUTH, AND THROAT: no cervical LAD, no thyromegaly or nodules appreciated. TMs are visualized and normal, oropharynx is clear.  RESPIRATORY: respirations non labored, CTA bl, no wheezes, rales, no forced expiratory wheezing.  CARDIOVASCULAR: Heart rate and rhythm were normal. No murmurs, rubs,gallops. There was no peripheral edema. No carotid bruits.  GASTROINTESTINAL: Positive bowel sounds, abdomen is soft, non tender, non distended.     MUSCULOSKELETAL: Muscle mass was normal for age. No joint synovitis or deformity.  LYMPHATIC: There were no enlarged nodes palpable.  SKIN/HAIR/NAILS: Skin color was normal.  No rashes.  NEUROLOGIC: The patient was alert and oriented.  Speech was normal.  " "There is no facial asymmetry.   PSYCHIATRIC:  Mood and affect were normal.   Breast exam: No axilla lymphadenopathy, breast masses or skin changes appreciated.    No results for input(s): \"HGB\", \"PLT\", \"INR\", \"NA\", \"POTASSIUM\", \"CR\", \"A1C\" in the last 8760 hours.     Diagnostics  Labs pending at this time.  Results will be reviewed when available.   No EKG required, no history of coronary heart disease, significant arrhythmia, peripheral arterial disease or other structural heart disease.    Revised Cardiac Risk Index (RCRI)  The patient has the following serious cardiovascular risks for perioperative complications:   - No serious cardiac risks = 0 points     RCRI Interpretation: 0 points: Class I (very low risk - 0.4% complication rate)         Signed Electronically by: Katina Humphries MD  A copy of this evaluation report is provided to the requesting physician.         "

## 2025-03-03 NOTE — PROGRESS NOTES
Preoperative Evaluation  [facility]  [address]  [place] [address]  Phone: [phone]  Fax: [phone]  Primary Provider: Shruti Lim NP  Pre-op Performing Provider: Katina Humphries MD  Mar 3, 2025             3/3/2025   Surgical Information   What procedure is being done? hysterecomy   Facility or [facility] where procedure/surgery will be performed: [facility]   Who is doing the procedure / surgery? Yaneth Morelos   Date of surgery / procedure: 03/13/2025   Time of surgery / procedure: 9:35am   Where do you plan to recover after surgery? at home with family     Fax number for surgical facility: Note does not need to be faxed, will be available electronically in Epic.    Assessment & Plan     The proposed surgical procedure is considered INTERMEDIATE risk.    Pre-op exam  Patient is medically stable for this medium risk surgery, will do preop labs.  Discussed not to take any vitamins NSAIDs or aspirin 1 week prior to surgery.  - CBC with platelets and differential  - Iron and iron binding capacity  - Basic metabolic panel  (Ca, Cl, CO2, Creat, Gluc, K, Na, BUN)    Endometrial polyp and dysfunctional uterine bleeding  She has recurrent endometrial polyps and due to heavy menstrual cycle and intermittent spotting, currently elected to have hysterectomy done.  - CBC with platelets and differential  - Iron and iron binding capacity  - TSH with free T4 reflex  - Basic metabolic panel  (Ca, Cl, CO2, Creat, Gluc, K, Na, BUN)    Menorrhagia with regular cycle  - CBC with platelets and differential  - Iron and iron binding capacity  - TSH with free T4 reflex  - Basic metabolic panel  (Ca, Cl, CO2, Creat, Gluc, K, Na, BUN)    Visit for screening mammogram  Exam is normal today  - MA Screening Bilateral w/ Joss; Future    Acquired hypothyroidism  She is on Synthroid, will check her levels today, discussed to take it with a small sip of  water morning of surgery  - TSH with free T4 reflex    Recommendation  Approval given to proceed with proposed procedure, without further diagnostic evaluation.    Subjective   Lindsay is a 39 year old, presenting for the following:  Pre-Op Exam (Pre-op for a ROBOTIC ASSIST TOTAL LAPAROSCOPIC HYSTERECTOMY BILATERAL SALPINGECTOMY CYSTOSCOPY at [facility] on 03/13/2025.)          3/3/2025    10:43 AM   Additional Questions   Roomed by Doris   Accompanied by Milagros     HPI:     Lindsay is a pleasant 39-year-old female with history of anxiety and endometrial and cervical polyps as well as menorrhagia.  Currently due to recurrent endometrial polyps and persistent vaginal spotting she is currently here for preoperative evaluation for laparoscopic hysterectomy.    Previous surgical procedures significant for D&C with hysteroscopy.  She denies any anesthesia side effects, no bleeding or clotting history.  No family history of such.    She exercises 30 minutes a day 6 days a week and denies any chest pains palpitations or shortness of breath, no history of smoking or family history of coronary artery disease.    No history of asthma, smoking, snoring or recent upper respiratory infections.    Her  has a vasectomy, she has not been sexually active in the last month due to persistent spotting, previous pregnancy testing done at her gynecologist office 2 to 3 weeks ago was negative.          3/3/2025   Pre-Op Questionnaire   Have you ever had a heart attack or stroke? No   Have you ever had surgery on your heart or blood vessels, such as a stent placement, a coronary artery bypass, or surgery on an artery in your head, neck, heart, or legs? No   Do you have chest pain with activity? No   Do you have a history of heart failure? No   Do you currently have a cold, bronchitis or symptoms of other infection? No   Do you have a cough, shortness of breath, or wheezing? No   Do you or anyone in your  family have previous history of blood clots? No   Do you or does anyone in your family have a serious bleeding problem such as prolonged bleeding following surgeries or cuts? No   Have you ever had problems with anemia or been told to take iron pills? No   Have you had any abnormal blood loss such as black, tarry or bloody stools, or abnormal vaginal bleeding? No   Have you ever had a blood transfusion? No   Are you willing to have a blood transfusion if it is medically needed before, during, or after your surgery? Yes   Have you or any of your relatives ever had problems with anesthesia? No   Do you have sleep apnea, excessive snoring or daytime drowsiness? No   Do you have any artifical heart valves or other implanted medical devices like a pacemaker, defibrillator, or continuous glucose monitor? No   Do you have artificial joints? No   Are you allergic to latex? No       Patient Active Problem List    Diagnosis Date Noted    Anxiety 03/10/2016     Priority: Medium    Dysthymia 03/10/2016     Priority: Medium    Hypothyroidism, unspecified hypothyroidism type 03/10/2016     Priority: Medium      Past Medical History:   Diagnosis Date    Depressive disorder 2016    After 1st pregnancy    Thyroid disease 2015    During 1st pregnancy     Past Surgical History:   Procedure Laterality Date    OPERATIVE HYSTEROSCOPY WITH MORCELLATOR  1/2/2014    Procedure: OPERATIVE HYSTEROSCOPY WITH MORCELLATOR;  OPERATIVE HYSTEROSCOPY, RESECTION OF ENDOMETRIAL POLYP;  Surgeon: Elizabeth Stover MD;  Location: [facility]    wisdom teeth[       Current Outpatient Medications   Medication Sig Dispense Refill    levothyroxine (SYNTHROID, LEVOTHROID) 88 MCG tablet Take 1 tablet (88 mcg) by mouth daily 90 tablet 0    liothyronine (CYTOMEL) 5 MCG tablet Take 1 tablet by mouth every evening      Multiple Vitamins-Minerals (MULTIVITAMIN ADULTS PO) Take 1 tablet by mouth At Bedtime      Omega-3 Fatty Acids (OMEGA-3 FISH OIL PO) Take by mouth daily   "    VITAMIN D, CHOLECALCIFEROL, PO Take 5,000 Units by mouth daily      XIIDRA 5 % opthalmic solution INSTILL 1 DROP TWICE DAILY IN BOTH EYES FOR DRY EYE SYNDROME INDEFINITELY.         No Known Allergies     Social History     Tobacco Use    Smoking status: Never    Smokeless tobacco: Never   Substance Use Topics    Alcohol use: Yes     Alcohol/week: 0.0 standard drinks of alcohol     Comment: occasional     Family History   Problem Relation Age of Onset    Hypertension Mother     Hypertension Father     Anxiety Disorder Sister      History   Drug Use No           Review of systems: As above she denies any constipation diarrhea, no dysuria    Objective    /89 (BP Location: Left arm, Patient Position: Sitting, Cuff Size: Adult Regular)   Pulse 71   Temp 97.1  F (36.2  C) (Oral)   Resp 11   Ht 1.795 m (5' 10.67\")   Wt 73.4 kg (161 lb 12.8 oz)   SpO2 97%   BMI 22.78 kg/m     Estimated body mass index is 22.78 kg/m  as calculated from the following:    Height as of this encounter: 1.795 m (5' 10.67\").    Weight as of this encounter: 73.4 kg (161 lb 12.8 oz).  Physical Exam  General: well appearing female, alert and oriented x3  EYES: Eyelids, conjunctiva, and sclera were normal. Pupils were normal.   HEAD, EARS, NOSE, MOUTH, AND THROAT: no cervical LAD, no thyromegaly or nodules appreciated. TMs are visualized and normal, oropharynx is clear.  RESPIRATORY: respirations non labored, CTA bl, no wheezes, rales, no forced expiratory wheezing.  CARDIOVASCULAR: Heart rate and rhythm were normal. No murmurs, rubs,gallops. There was no peripheral edema. No carotid bruits.  GASTROINTESTINAL: Positive bowel sounds, abdomen is soft, non tender, non distended.     MUSCULOSKELETAL: Muscle mass was normal for age. No joint synovitis or deformity.  LYMPHATIC: There were no enlarged nodes palpable.  SKIN/HAIR/NAILS: Skin color was normal.  No rashes.  NEUROLOGIC: The patient was alert and oriented.  Speech was normal.  " "There is no facial asymmetry.   PSYCHIATRIC:  Mood and affect were normal.   Breast exam: No axilla lymphadenopathy, breast masses or skin changes appreciated.    No results for input(s): \"HGB\", \"PLT\", \"INR\", \"NA\", \"POTASSIUM\", \"CR\", \"A1C\" in the last 8760 hours.     Diagnostics  Labs pending at this time.  Results will be reviewed when available.   No EKG required, no history of coronary heart disease, significant arrhythmia, peripheral arterial disease or other structural heart disease.    Revised Cardiac Risk Index (RCRI)  The patient has the following serious cardiovascular risks for perioperative complications:   - No serious cardiac risks = 0 points     RCRI Interpretation: 0 points: Class I (very low risk - 0.4% complication rate)         Signed Electronically by: Katina Humphries MD  A copy of this evaluation report is provided to the requesting physician.         "

## 2025-03-03 NOTE — PATIENT INSTRUCTIONS
Morning of surgery take thyroid medication with small sip of water, hold everything else.    2. 1 Week prior do npt take any NSAIDs or Aspirin, O'K to take Tylenol for pain.    3. For constipation on narcotic pain medication ( Miralax is mild, Senna is stronger.).    4. Labs today     5. Schedule mammogram

## 2025-03-04 ENCOUNTER — PATIENT OUTREACH (OUTPATIENT)
Dept: CARE COORDINATION | Facility: CLINIC | Age: 40
End: 2025-03-04
Payer: COMMERCIAL

## 2025-03-05 ENCOUNTER — MYC MEDICAL ADVICE (OUTPATIENT)
Dept: INTERNAL MEDICINE | Facility: CLINIC | Age: 40
End: 2025-03-05
Payer: COMMERCIAL

## 2025-03-13 ENCOUNTER — ANESTHESIA (OUTPATIENT)
Dept: SURGERY | Facility: CLINIC | Age: 40
End: 2025-03-13
Payer: COMMERCIAL

## 2025-03-13 ENCOUNTER — ANESTHESIA EVENT (OUTPATIENT)
Dept: SURGERY | Facility: CLINIC | Age: 40
End: 2025-03-13
Payer: COMMERCIAL

## 2025-03-13 ENCOUNTER — HOSPITAL ENCOUNTER (OUTPATIENT)
Facility: CLINIC | Age: 40
Discharge: HOME | End: 2025-03-13
Attending: OBSTETRICS & GYNECOLOGY | Admitting: OBSTETRICS & GYNECOLOGY
Payer: COMMERCIAL

## 2025-03-13 VITALS
DIASTOLIC BLOOD PRESSURE: 74 MMHG | RESPIRATION RATE: 16 BRPM | BODY MASS INDEX: 23.16 KG/M2 | SYSTOLIC BLOOD PRESSURE: 133 MMHG | OXYGEN SATURATION: 100 % | WEIGHT: 161.75 LBS | HEIGHT: 70 IN | TEMPERATURE: 97.4 F | HEART RATE: 59 BPM

## 2025-03-13 DIAGNOSIS — Z90.710 S/P HYSTERECTOMY: Primary | ICD-10-CM

## 2025-03-13 LAB — HCG UR QL: NEGATIVE

## 2025-03-13 PROCEDURE — 88307 TISSUE EXAM BY PATHOLOGIST: CPT | Mod: 26 | Performed by: PATHOLOGY

## 2025-03-13 PROCEDURE — 360N000080 HC SURGERY LEVEL 7, PER MIN: Performed by: OBSTETRICS & GYNECOLOGY

## 2025-03-13 PROCEDURE — 250N000009 HC RX 250: Performed by: NURSE ANESTHETIST, CERTIFIED REGISTERED

## 2025-03-13 PROCEDURE — 272N000001 HC OR GENERAL SUPPLY STERILE: Performed by: OBSTETRICS & GYNECOLOGY

## 2025-03-13 PROCEDURE — 250N000011 HC RX IP 250 OP 636: Performed by: OBSTETRICS & GYNECOLOGY

## 2025-03-13 PROCEDURE — 88302 TISSUE EXAM BY PATHOLOGIST: CPT | Mod: TC | Performed by: OBSTETRICS & GYNECOLOGY

## 2025-03-13 PROCEDURE — 710N000010 HC RECOVERY PHASE 1, LEVEL 2, PER MIN: Performed by: OBSTETRICS & GYNECOLOGY

## 2025-03-13 PROCEDURE — 250N000011 HC RX IP 250 OP 636: Performed by: NURSE ANESTHETIST, CERTIFIED REGISTERED

## 2025-03-13 PROCEDURE — 250N000011 HC RX IP 250 OP 636: Performed by: STUDENT IN AN ORGANIZED HEALTH CARE EDUCATION/TRAINING PROGRAM

## 2025-03-13 PROCEDURE — 370N000017 HC ANESTHESIA TECHNICAL FEE, PER MIN: Performed by: OBSTETRICS & GYNECOLOGY

## 2025-03-13 PROCEDURE — 44602 SUTURE SMALL INTESTINE: CPT | Mod: 52 | Performed by: SURGERY

## 2025-03-13 PROCEDURE — 250N000013 HC RX MED GY IP 250 OP 250 PS 637: Performed by: STUDENT IN AN ORGANIZED HEALTH CARE EDUCATION/TRAINING PROGRAM

## 2025-03-13 PROCEDURE — 81025 URINE PREGNANCY TEST: CPT | Performed by: OBSTETRICS & GYNECOLOGY

## 2025-03-13 PROCEDURE — 258N000003 HC RX IP 258 OP 636: Performed by: NURSE ANESTHETIST, CERTIFIED REGISTERED

## 2025-03-13 PROCEDURE — 258N000003 HC RX IP 258 OP 636: Performed by: STUDENT IN AN ORGANIZED HEALTH CARE EDUCATION/TRAINING PROGRAM

## 2025-03-13 PROCEDURE — 710N000012 HC RECOVERY PHASE 2, PER MINUTE: Performed by: OBSTETRICS & GYNECOLOGY

## 2025-03-13 PROCEDURE — 258N000003 HC RX IP 258 OP 636: Performed by: PATHOLOGY

## 2025-03-13 PROCEDURE — 999N000141 HC STATISTIC PRE-PROCEDURE NURSING ASSESSMENT: Performed by: OBSTETRICS & GYNECOLOGY

## 2025-03-13 RX ORDER — DEXAMETHASONE SODIUM PHOSPHATE 10 MG/ML
INJECTION, SOLUTION INTRAMUSCULAR; INTRAVENOUS PRN
Status: DISCONTINUED | OUTPATIENT
Start: 2025-03-13 | End: 2025-03-13

## 2025-03-13 RX ORDER — ONDANSETRON 2 MG/ML
INJECTION INTRAMUSCULAR; INTRAVENOUS PRN
Status: DISCONTINUED | OUTPATIENT
Start: 2025-03-13 | End: 2025-03-13

## 2025-03-13 RX ORDER — OXYCODONE HYDROCHLORIDE 5 MG/1
5 TABLET ORAL EVERY 4 HOURS PRN
Qty: 18 TABLET | Refills: 0 | Status: SHIPPED | OUTPATIENT
Start: 2025-03-13

## 2025-03-13 RX ORDER — ONDANSETRON 2 MG/ML
4 INJECTION INTRAMUSCULAR; INTRAVENOUS EVERY 30 MIN PRN
Status: DISCONTINUED | OUTPATIENT
Start: 2025-03-13 | End: 2025-03-13 | Stop reason: HOSPADM

## 2025-03-13 RX ORDER — ONDANSETRON 4 MG/1
4 TABLET, ORALLY DISINTEGRATING ORAL EVERY 8 HOURS PRN
Qty: 4 TABLET | Refills: 0 | Status: SHIPPED | OUTPATIENT
Start: 2025-03-13

## 2025-03-13 RX ORDER — IBUPROFEN 400 MG/1
800 TABLET, FILM COATED ORAL ONCE
Status: DISCONTINUED | OUTPATIENT
Start: 2025-03-13 | End: 2025-03-13 | Stop reason: HOSPADM

## 2025-03-13 RX ORDER — FENTANYL CITRATE 50 UG/ML
50 INJECTION, SOLUTION INTRAMUSCULAR; INTRAVENOUS EVERY 5 MIN PRN
Status: DISCONTINUED | OUTPATIENT
Start: 2025-03-13 | End: 2025-03-13 | Stop reason: HOSPADM

## 2025-03-13 RX ORDER — ACETAMINOPHEN 325 MG/1
975 TABLET ORAL ONCE
Status: COMPLETED | OUTPATIENT
Start: 2025-03-13 | End: 2025-03-13

## 2025-03-13 RX ORDER — FENTANYL CITRATE 50 UG/ML
INJECTION, SOLUTION INTRAMUSCULAR; INTRAVENOUS PRN
Status: DISCONTINUED | OUTPATIENT
Start: 2025-03-13 | End: 2025-03-13

## 2025-03-13 RX ORDER — LIDOCAINE HYDROCHLORIDE 10 MG/ML
INJECTION, SOLUTION INFILTRATION; PERINEURAL PRN
Status: DISCONTINUED | OUTPATIENT
Start: 2025-03-13 | End: 2025-03-13

## 2025-03-13 RX ORDER — LIDOCAINE 40 MG/G
CREAM TOPICAL
Status: DISCONTINUED | OUTPATIENT
Start: 2025-03-13 | End: 2025-03-13 | Stop reason: HOSPADM

## 2025-03-13 RX ORDER — BUPIVACAINE HYDROCHLORIDE 2.5 MG/ML
INJECTION, SOLUTION INFILTRATION; PERINEURAL
Status: DISCONTINUED
Start: 2025-03-13 | End: 2025-03-13 | Stop reason: HOSPADM

## 2025-03-13 RX ORDER — HYDROMORPHONE HCL IN WATER/PF 6 MG/30 ML
0.4 PATIENT CONTROLLED ANALGESIA SYRINGE INTRAVENOUS EVERY 5 MIN PRN
Status: DISCONTINUED | OUTPATIENT
Start: 2025-03-13 | End: 2025-03-13 | Stop reason: HOSPADM

## 2025-03-13 RX ORDER — NALOXONE HYDROCHLORIDE 0.4 MG/ML
0.1 INJECTION, SOLUTION INTRAMUSCULAR; INTRAVENOUS; SUBCUTANEOUS
Status: DISCONTINUED | OUTPATIENT
Start: 2025-03-13 | End: 2025-03-13 | Stop reason: HOSPADM

## 2025-03-13 RX ORDER — AMOXICILLIN 250 MG
1-2 CAPSULE ORAL 2 TIMES DAILY PRN
Qty: 30 TABLET | Refills: 0 | Status: SHIPPED | OUTPATIENT
Start: 2025-03-13

## 2025-03-13 RX ORDER — ONDANSETRON 4 MG/1
4 TABLET, ORALLY DISINTEGRATING ORAL EVERY 30 MIN PRN
Status: DISCONTINUED | OUTPATIENT
Start: 2025-03-13 | End: 2025-03-13 | Stop reason: HOSPADM

## 2025-03-13 RX ORDER — METRONIDAZOLE 500 MG/100ML
500 INJECTION, SOLUTION INTRAVENOUS
Status: COMPLETED | OUTPATIENT
Start: 2025-03-13 | End: 2025-03-13

## 2025-03-13 RX ORDER — PROPOFOL 10 MG/ML
INJECTION, EMULSION INTRAVENOUS CONTINUOUS PRN
Status: DISCONTINUED | OUTPATIENT
Start: 2025-03-13 | End: 2025-03-13

## 2025-03-13 RX ORDER — SODIUM CHLORIDE, SODIUM LACTATE, POTASSIUM CHLORIDE, CALCIUM CHLORIDE 600; 310; 30; 20 MG/100ML; MG/100ML; MG/100ML; MG/100ML
INJECTION, SOLUTION INTRAVENOUS CONTINUOUS
Status: DISCONTINUED | OUTPATIENT
Start: 2025-03-13 | End: 2025-03-13 | Stop reason: HOSPADM

## 2025-03-13 RX ORDER — ACETAMINOPHEN 10 MG/ML
1000 INJECTION, SOLUTION INTRAVENOUS ONCE
Status: COMPLETED | OUTPATIENT
Start: 2025-03-13 | End: 2025-03-13

## 2025-03-13 RX ORDER — KETOROLAC TROMETHAMINE 30 MG/ML
INJECTION, SOLUTION INTRAMUSCULAR; INTRAVENOUS PRN
Status: DISCONTINUED | OUTPATIENT
Start: 2025-03-13 | End: 2025-03-13

## 2025-03-13 RX ORDER — LABETALOL HYDROCHLORIDE 5 MG/ML
10 INJECTION, SOLUTION INTRAVENOUS
Status: DISCONTINUED | OUTPATIENT
Start: 2025-03-13 | End: 2025-03-13 | Stop reason: HOSPADM

## 2025-03-13 RX ORDER — CEFAZOLIN SODIUM/WATER 2 G/20 ML
2 SYRINGE (ML) INTRAVENOUS SEE ADMIN INSTRUCTIONS
Status: DISCONTINUED | OUTPATIENT
Start: 2025-03-13 | End: 2025-03-13 | Stop reason: HOSPADM

## 2025-03-13 RX ORDER — DEXAMETHASONE SODIUM PHOSPHATE 10 MG/ML
4 INJECTION, SOLUTION INTRAMUSCULAR; INTRAVENOUS
Status: DISCONTINUED | OUTPATIENT
Start: 2025-03-13 | End: 2025-03-13 | Stop reason: HOSPADM

## 2025-03-13 RX ORDER — FENTANYL CITRATE 50 UG/ML
25 INJECTION, SOLUTION INTRAMUSCULAR; INTRAVENOUS EVERY 10 MIN PRN
Status: DISCONTINUED | OUTPATIENT
Start: 2025-03-13 | End: 2025-03-13 | Stop reason: HOSPADM

## 2025-03-13 RX ORDER — HYDROXYZINE HYDROCHLORIDE 25 MG/1
25 TABLET, FILM COATED ORAL
Status: COMPLETED | OUTPATIENT
Start: 2025-03-13 | End: 2025-03-13

## 2025-03-13 RX ORDER — IBUPROFEN 800 MG/1
800 TABLET, FILM COATED ORAL EVERY 6 HOURS PRN
Qty: 30 TABLET | Refills: 0 | Status: SHIPPED | OUTPATIENT
Start: 2025-03-13

## 2025-03-13 RX ORDER — HYDROMORPHONE HCL IN WATER/PF 6 MG/30 ML
0.2 PATIENT CONTROLLED ANALGESIA SYRINGE INTRAVENOUS EVERY 5 MIN PRN
Status: DISCONTINUED | OUTPATIENT
Start: 2025-03-13 | End: 2025-03-13 | Stop reason: HOSPADM

## 2025-03-13 RX ORDER — MAGNESIUM SULFATE HEPTAHYDRATE 40 MG/ML
2 INJECTION, SOLUTION INTRAVENOUS ONCE
Status: COMPLETED | OUTPATIENT
Start: 2025-03-13 | End: 2025-03-13

## 2025-03-13 RX ORDER — FENTANYL CITRATE 50 UG/ML
25 INJECTION, SOLUTION INTRAMUSCULAR; INTRAVENOUS EVERY 5 MIN PRN
Status: DISCONTINUED | OUTPATIENT
Start: 2025-03-13 | End: 2025-03-13 | Stop reason: HOSPADM

## 2025-03-13 RX ORDER — PROPOFOL 10 MG/ML
INJECTION, EMULSION INTRAVENOUS PRN
Status: DISCONTINUED | OUTPATIENT
Start: 2025-03-13 | End: 2025-03-13

## 2025-03-13 RX ORDER — OXYCODONE HYDROCHLORIDE 5 MG/1
5 TABLET ORAL
Status: DISCONTINUED | OUTPATIENT
Start: 2025-03-13 | End: 2025-03-13 | Stop reason: HOSPADM

## 2025-03-13 RX ORDER — METHOCARBAMOL 100 MG/ML
500 INJECTION, SOLUTION INTRAMUSCULAR; INTRAVENOUS ONCE
Status: COMPLETED | OUTPATIENT
Start: 2025-03-13 | End: 2025-03-13

## 2025-03-13 RX ORDER — ACETAMINOPHEN 325 MG/1
975 TABLET ORAL ONCE
Status: DISCONTINUED | OUTPATIENT
Start: 2025-03-13 | End: 2025-03-13 | Stop reason: HOSPADM

## 2025-03-13 RX ORDER — DEXAMETHASONE SODIUM PHOSPHATE 4 MG/ML
4 INJECTION, SOLUTION INTRA-ARTICULAR; INTRALESIONAL; INTRAMUSCULAR; INTRAVENOUS; SOFT TISSUE
Status: DISCONTINUED | OUTPATIENT
Start: 2025-03-13 | End: 2025-03-13 | Stop reason: HOSPADM

## 2025-03-13 RX ORDER — BUPIVACAINE HYDROCHLORIDE 2.5 MG/ML
INJECTION, SOLUTION INFILTRATION; PERINEURAL PRN
Status: DISCONTINUED | OUTPATIENT
Start: 2025-03-13 | End: 2025-03-13 | Stop reason: HOSPADM

## 2025-03-13 RX ORDER — OXYCODONE HYDROCHLORIDE 5 MG/1
5 TABLET ORAL
Status: COMPLETED | OUTPATIENT
Start: 2025-03-13 | End: 2025-03-13

## 2025-03-13 RX ORDER — CEFAZOLIN SODIUM/WATER 2 G/20 ML
2 SYRINGE (ML) INTRAVENOUS
Status: COMPLETED | OUTPATIENT
Start: 2025-03-13 | End: 2025-03-13

## 2025-03-13 RX ADMIN — ONDANSETRON 4 MG: 2 INJECTION INTRAMUSCULAR; INTRAVENOUS at 10:08

## 2025-03-13 RX ADMIN — Medication 100 MG: at 12:16

## 2025-03-13 RX ADMIN — SODIUM CHLORIDE, POTASSIUM CHLORIDE, SODIUM LACTATE AND CALCIUM CHLORIDE: 600; 310; 30; 20 INJECTION, SOLUTION INTRAVENOUS at 10:22

## 2025-03-13 RX ADMIN — SODIUM CHLORIDE, SODIUM LACTATE, POTASSIUM CHLORIDE, AND CALCIUM CHLORIDE 500 ML: .6; .31; .03; .02 INJECTION, SOLUTION INTRAVENOUS at 08:37

## 2025-03-13 RX ADMIN — FENTANYL CITRATE 50 MCG: 50 INJECTION INTRAMUSCULAR; INTRAVENOUS at 09:55

## 2025-03-13 RX ADMIN — FENTANYL CITRATE 25 MCG: 50 INJECTION INTRAMUSCULAR; INTRAVENOUS at 09:14

## 2025-03-13 RX ADMIN — ROCURONIUM BROMIDE 50 MG: 10 INJECTION, SOLUTION INTRAVENOUS at 09:55

## 2025-03-13 RX ADMIN — MIDAZOLAM 2 MG: 1 INJECTION INTRAMUSCULAR; INTRAVENOUS at 09:46

## 2025-03-13 RX ADMIN — HYDROXYZINE HYDROCHLORIDE 25 MG: 25 TABLET, FILM COATED ORAL at 14:35

## 2025-03-13 RX ADMIN — LIDOCAINE HYDROCHLORIDE 50 MG: 10 INJECTION, SOLUTION INFILTRATION; PERINEURAL at 09:55

## 2025-03-13 RX ADMIN — ROCURONIUM BROMIDE 30 MG: 10 INJECTION, SOLUTION INTRAVENOUS at 10:39

## 2025-03-13 RX ADMIN — HYDROMORPHONE HYDROCHLORIDE 0.4 MG: 0.2 INJECTION, SOLUTION INTRAMUSCULAR; INTRAVENOUS; SUBCUTANEOUS at 14:24

## 2025-03-13 RX ADMIN — HYDROMORPHONE HYDROCHLORIDE 0.5 MG: 1 INJECTION, SOLUTION INTRAMUSCULAR; INTRAVENOUS; SUBCUTANEOUS at 10:40

## 2025-03-13 RX ADMIN — SODIUM CHLORIDE, POTASSIUM CHLORIDE, SODIUM LACTATE AND CALCIUM CHLORIDE: 600; 310; 30; 20 INJECTION, SOLUTION INTRAVENOUS at 09:01

## 2025-03-13 RX ADMIN — METRONIDAZOLE 500 MG: 500 INJECTION, SOLUTION INTRAVENOUS at 08:41

## 2025-03-13 RX ADMIN — FENTANYL CITRATE 25 MCG: 50 INJECTION INTRAMUSCULAR; INTRAVENOUS at 09:00

## 2025-03-13 RX ADMIN — ROCURONIUM BROMIDE 20 MG: 10 INJECTION, SOLUTION INTRAVENOUS at 10:45

## 2025-03-13 RX ADMIN — PHENYLEPHRINE HYDROCHLORIDE 200 MCG: 10 INJECTION INTRAVENOUS at 10:19

## 2025-03-13 RX ADMIN — METHOCARBAMOL 500 MG: 100 INJECTION INTRAMUSCULAR; INTRAVENOUS at 15:02

## 2025-03-13 RX ADMIN — PROPOFOL 200 MCG/KG/MIN: 10 INJECTION, EMULSION INTRAVENOUS at 09:55

## 2025-03-13 RX ADMIN — MAGNESIUM SULFATE HEPTAHYDRATE 2 G: 40 INJECTION, SOLUTION INTRAVENOUS at 14:40

## 2025-03-13 RX ADMIN — ROCURONIUM BROMIDE 30 MG: 10 INJECTION, SOLUTION INTRAVENOUS at 10:15

## 2025-03-13 RX ADMIN — HYDROMORPHONE HYDROCHLORIDE 0.5 MG: 1 INJECTION, SOLUTION INTRAMUSCULAR; INTRAVENOUS; SUBCUTANEOUS at 11:22

## 2025-03-13 RX ADMIN — FENTANYL CITRATE 50 MCG: 50 INJECTION INTRAMUSCULAR; INTRAVENOUS at 10:03

## 2025-03-13 RX ADMIN — HYDROMORPHONE HYDROCHLORIDE 0.2 MG: 0.2 INJECTION, SOLUTION INTRAMUSCULAR; INTRAVENOUS; SUBCUTANEOUS at 13:48

## 2025-03-13 RX ADMIN — Medication 2 G: at 09:46

## 2025-03-13 RX ADMIN — ACETAMINOPHEN 1000 MG: 10 INJECTION, SOLUTION INTRAVENOUS at 15:03

## 2025-03-13 RX ADMIN — Medication 100 MG: at 12:14

## 2025-03-13 RX ADMIN — FENTANYL CITRATE 50 MCG: 50 INJECTION INTRAMUSCULAR; INTRAVENOUS at 13:25

## 2025-03-13 RX ADMIN — ROCURONIUM BROMIDE 30 MG: 10 INJECTION, SOLUTION INTRAVENOUS at 11:20

## 2025-03-13 RX ADMIN — ROCURONIUM BROMIDE 30 MG: 10 INJECTION, SOLUTION INTRAVENOUS at 11:43

## 2025-03-13 RX ADMIN — PROPOFOL 150 MG: 10 INJECTION, EMULSION INTRAVENOUS at 09:55

## 2025-03-13 RX ADMIN — FENTANYL CITRATE 50 MCG: 50 INJECTION INTRAMUSCULAR; INTRAVENOUS at 12:36

## 2025-03-13 RX ADMIN — ACETAMINOPHEN 975 MG: 325 TABLET ORAL at 08:17

## 2025-03-13 RX ADMIN — KETOROLAC TROMETHAMINE 30 MG: 30 INJECTION, SOLUTION INTRAMUSCULAR at 12:08

## 2025-03-13 RX ADMIN — DEXAMETHASONE SODIUM PHOSPHATE 10 MG: 10 INJECTION, SOLUTION INTRAMUSCULAR; INTRAVENOUS at 09:55

## 2025-03-13 RX ADMIN — OXYCODONE 5 MG: 5 TABLET ORAL at 15:46

## 2025-03-13 ASSESSMENT — ACTIVITIES OF DAILY LIVING (ADL)
ADLS_ACUITY_SCORE: 18
ADLS_ACUITY_SCORE: 18
ADLS_ACUITY_SCORE: 19
ADLS_ACUITY_SCORE: 18

## 2025-03-13 NOTE — DISCHARGE INSTRUCTIONS
Laparoscopic Hysterectomy: What to Expect at Home  Your Recovery     A laparoscopic hysterectomy is surgery to take out the uterus. Your doctor put a lighted tube and surgical tools through small cuts in your belly to remove the uterus. The cervix is usually removed too. In some cases, the ovaries and fallopian tubes also are taken out at the same time.  You can expect to feel better and stronger each day. But you might need pain medicine for a week or two. After a laparoscopy, you may have shoulder pain. This is caused by the air your doctor put in your belly to help see your organs better. The pain may last for a day or two. You may get tired easily or have less energy than usual. The tiredness may last for several weeks after surgery. And you also may have light vaginal bleeding for a few weeks.  It's important to avoid lifting while you are recovering so that you can heal. It may take about 4 to 6 weeks to fully recover. The recovery time may be shorter for some people.  This care sheet gives you a general idea about how long it will take for you to recover. But each person recovers at a different pace. Follow the steps below to get better as quickly as possible.  How can you care for yourself at home?  Activity    Rest when you feel tired. Getting enough sleep will help you recover.     Try to walk each day. Start by walking a little more than you did the day before. Bit by bit, increase the amount you walk. Walking boosts blood flow and helps prevent pneumonia and constipation.     Avoid lifting anything that would make you strain. This may include heavy grocery bags and milk containers, a heavy briefcase or backpack, bags of cat litter or dog food, a vacuum , or a child.     Avoid strenuous activities, such as biking, jogging, weight lifting, or aerobic exercise, until your doctor says it is okay.     Ask your doctor when you can drive again.     You may shower 24 to 48 hours after surgery, if your  doctor okays it. Pat the incision dry. Do not take a bath for the first 2 weeks, or until your doctor tells you it is okay.     Ask your doctor when it is okay for you to have sex.   Diet    You can eat your normal diet. If your stomach is upset, try bland, low-fat foods like plain rice, broiled chicken, toast, and yogurt.     If your bowel movements are not regular right after surgery, try to avoid constipation and straining. Drink plenty of water. Your doctor may suggest fiber, a stool softener, or a mild laxative.   Medicines    Your doctor will tell you if and when you can restart your medicines. You will also get instructions about taking any new medicines.     If you stopped taking aspirin or some other blood thinner, your doctor will tell you when to start taking it again.     Be safe with medicines. Read and follow all instructions on the label.  If the doctor gave you a prescription medicine for pain, take it as prescribed.  If you are not taking a prescription pain medicine, ask your doctor if you can take an over-the-counter medicine.     If your doctor prescribed antibiotics, take them as directed. Do not stop taking them just because you feel better. You need to take the full course of antibiotics.   Incision care    You may have stitches over the cuts (incisions) the doctor made in your belly.     If you have strips of tape on the cut (incision) the doctor made, leave the tape on for a week or until it falls off.     Wash the area daily with warm, soapy water, and pat it dry. Don't use hydrogen peroxide or alcohol. They can slow healing.     You may cover the area with a gauze bandage if it oozes fluid or rubs against clothing.     Change the bandage every day.   Other instructions    You may have some light vaginal bleeding. Wear sanitary pads if needed. Do not douche or use tampons.   Follow-up care is a key part of your treatment and safety. Be sure to make and go to all appointments, and call your  "doctor if you are having problems. It's also a good idea to know your test results and keep a list of the medicines you take.  When should you call for help?   Call 911 anytime you think you may need emergency care. For example, call if:    You passed out (lost consciousness).     You have chest pain, are short of breath, or cough up blood.   Call your doctor now or seek immediate medical care if:    You have pain that does not get better after you take pain medicine.     You cannot pass stools or gas.     You have vaginal discharge that has increased in amount or smells bad.     You are sick to your stomach or cannot drink fluids.     You have loose stitches, or your incision comes open.     Bright red blood has soaked through the bandage over your incision.     You have signs of infection, such as:  Increased pain, swelling, warmth, or redness.  Red streaks leading from the incision.  Pus draining from the incision.  A fever.     You have severe vaginal bleeding. This means that you are soaking through your usual pads every hour for 2 or more hours.     You have signs of a blood clot in your leg (called a deep vein thrombosis), such as:  Pain in your calf, back of the knee, thigh, or groin.  Redness and swelling in your leg or groin.   Watch closely for changes in your health, and be sure to contact your doctor if you have any problems.  Where can you learn more?  Go to https://www.[employer].net/patiented  Enter Q131 in the search box to learn more about \"Laparoscopic Hysterectomy: What to Expect at Home.\"  Current as of: April 30, 2024  Content Version: 14.4    4185-9594 [employer].   Care instructions adapted under license by your healthcare professional. If you have questions about a medical condition or this instruction, always ask your healthcare professional. [employer] disclaims any warranty or liability for your use of this information.  After Anesthesia (Sleep Medicine)  What " should I do after anesthesia?  You should rest and relax for the next 24 hours. Avoid risky or difficult (strenuous) activity. A responsible adult should stay with you overnight.  Don't drive or use any heavy equipment for 24 hours. Even if you feel normal, your reactions may be affected by the sleep medicine given to you.  Don't drink alcohol or make any important decisions for 24 hours.  Slowly get back to your regular diet, as you feel able.  How should I expect to feel?  It's normal to feel dizzy, light-headed, or faint for up to a full day after anesthesia or while taking pain medicine. If this happens:   Sit down for a few minutes before standing.  Have someone help you when you get up to walk or use the bathroom.  If you have nausea (feel sick to your stomach) or vomit (throw up):   Drink clear liquids (such as apple juice, ginger ale, broth, or 7UP) until you feel better.  If you feel sick to your stomach, or you keep vomiting for 24 hours, please call the doctor.  What else should I know?  You might have a dry mouth, sore throat, muscle aches, or trouble sleeping. These should go away after 24 hours.  Please contact your doctor if you have any other symptoms that concern you, such as fever, pain, bleeding, fluid drainage, swelling, or headache, or if it's been over 8 to 10 hours and you still aren't able to pee (urinate).  If you have a history of sleep apnea, it's very important to use your CPAP machine for the next 24 hours when you nap or sleep.   For informational purposes only. Not to replace the advice of your health care provider. Copyright   2023 [employer]. All rights reserved. Clinically reviewed by Darien Crum MD. [employer] [id number] - REV 09/23.    You received Toradol (Ketoralac) at 1210, do not take any NSAID product such as ibuprofen until 6:10PM   You're last received 1000mg of Tylenol (acetaminophen) at 1510, you may take Tylenol again at 910PM  Do not exceed 4947-9480 mg  of Tylenol in a 24 hr period

## 2025-03-13 NOTE — ANESTHESIA PREPROCEDURE EVALUATION
Anesthesia Pre-Procedure Evaluation    Patient: Lindsay Burch   MRN: [id number] : 1985        Procedure : Procedure(s):  ROBOTIC ASSIST TOTAL LAPAROSCOPIC HYSTERECTOMY BILATERAL SALPINGECTOMY CYSTOSCOPY          Past Medical History:   Diagnosis Date    Depressive disorder 2016    After 1st pregnancy    Thyroid disease 2015    During 1st pregnancy      Past Surgical History:   Procedure Laterality Date    OPERATIVE HYSTEROSCOPY WITH MORCELLATOR  2014    Procedure: OPERATIVE HYSTEROSCOPY WITH MORCELLATOR;  OPERATIVE HYSTEROSCOPY, RESECTION OF ENDOMETRIAL POLYP;  Surgeon: Elizabeth Stover MD;  Location:  [facility]    wisdom teeth[        No Known Allergies   Social History     Tobacco Use    Smoking status: Never    Smokeless tobacco: Never   Substance Use Topics    Alcohol use: Yes     Alcohol/week: 0.0 standard drinks of alcohol     Comment: occasional      Wt Readings from Last 1 Encounters:   25 73.4 kg (161 lb 12.8 oz)        Anesthesia Evaluation   Pt has had prior anesthetic.     No history of anesthetic complications       ROS/MED HX  ENT/Pulmonary:  - neg pulmonary ROS     Neurologic:     (+)      migraines,                          Cardiovascular:  - neg cardiovascular ROS  (-) murmur   METS/Exercise Tolerance:     Hematologic:  - neg hematologic  ROS     Musculoskeletal:  - neg musculoskeletal ROS     GI/Hepatic:  - neg GI/hepatic ROS     Renal/Genitourinary:  - neg Renal ROS     Endo:     (+)          thyroid problem,            Psychiatric/Substance Use:  - neg psychiatric ROS     Infectious Disease:  - neg infectious disease ROS     Malignancy:  - neg malignancy ROS     Other:  - neg other ROS          Physical Exam    Airway  airway exam normal      Mallampati: II   TM distance: > 3 FB   Neck ROM: full   Mouth opening: > 3 cm    Respiratory Devices and Support         Dental       (+) Completely normal teeth      Cardiovascular   cardiovascular exam normal       Rhythm and rate:  "regular and normal (-) no murmur    Pulmonary   pulmonary exam normal        breath sounds clear to auscultation           OUTSIDE LABS:  CBC:   Lab Results   Component Value Date    WBC 5.5 03/03/2025    WBC 10.3 10/03/2017    HGB 14.3 03/03/2025    HCT 41.5 03/03/2025     03/03/2025     BMP:   Lab Results   Component Value Date     03/03/2025    POTASSIUM 3.8 03/03/2025    CHLORIDE 103 03/03/2025    CO2 26 03/03/2025    BUN 15.5 03/03/2025    CR 1.11 (H) 03/03/2025    GLC 88 03/03/2025    GLC 92 05/15/2024     COAGS: No results found for: \"PTT\", \"INR\", \"FIBR\"  POC:   Lab Results   Component Value Date    HCG Negative 03/13/2025     HEPATIC: No results found for: \"ALBUMIN\", \"PROTTOTAL\", \"ALT\", \"AST\", \"GGT\", \"ALKPHOS\", \"BILITOTAL\", \"BILIDIRECT\", \"BISHNU\"  OTHER:   Lab Results   Component Value Date    SHANITA 9.3 03/03/2025    TSH 1.53 03/03/2025       Anesthesia Plan    ASA Status:  2    NPO Status:  NPO Appropriate    Anesthesia Type: General.     - Airway: ETT   Induction: Intravenous.   Maintenance: TIVA.        Consents    Anesthesia Plan(s) and associated risks, benefits, and realistic alternatives discussed. Questions answered and patient/representative(s) expressed understanding.     - Discussed: Risks, Benefits and Alternatives for BOTH SEDATION and the PROCEDURE were discussed     - Discussed with:  Patient            Postoperative Care    Pain management: IV analgesics, Oral pain medications.   PONV prophylaxis: Ondansetron (or other 5HT-3), Dexamethasone or Solumedrol     Comments:               Alvin Garcia MD    I have reviewed the pertinent notes and labs in the chart from the past 30 days and (re)examined the patient.  Any updates or changes from those notes are reflected in this note.    Clinically Significant Risk Factors Present on Admission                                          "

## 2025-03-13 NOTE — PHARMACY-ADMISSION MEDICATION HISTORY
Pharmacist Admission Medication History    Admission medication history is complete. The information provided in this note is only as accurate as the sources available at the time of the update.    Information Source(s): Patient and CareEverywhere/SureScripts via in-person      Allergies reviewed with patient and updates made in EHR: yes    Medication History Completed By: Donna Schultz RPH 3/13/2025 8:43 AM    PTA Med List   Medication Sig Last Dose/Taking    fish oil-omega-3 fatty acids 1000 MG capsule Take 1 g by mouth daily. 3/6/2025    levothyroxine (SYNTHROID, LEVOTHROID) 88 MCG tablet Take 1 tablet (88 mcg) by mouth daily 3/13/2025 at  6:00 AM    liothyronine (CYTOMEL) 5 MCG tablet Take 5 mcg by mouth daily at 2 pm. 3/12/2025 at  2:00 PM    Multiple Vitamins-Minerals (MULTIVITAMIN ADULTS PO) Take 1 tablet by mouth At Bedtime 3/6/2025    VITAMIN D, CHOLECALCIFEROL, PO Take 5,000 Units by mouth daily 3/6/2025    XIIDRA 5 % opthalmic solution Place 1 drop into both eyes 2 times daily. 3/13/2025 Morning

## 2025-03-13 NOTE — OP NOTE
[facility]  Operative Note    Pre-operative diagnosis: Iatrogenic bowel injury   Post-operative diagnosis Iatrogenic bowel injury   Procedure: ROBOTIC ENTEROTOMY REPAIR   Surgeon: Robbi Diaz MD   Assistants(s): None   Anesthesia: General    Estimated blood loss: Minimal    Total IV fluids: (See anesthesia record)   Blood transfusion: No transfusion was given during surgery   Total urine output: (See anesthesia record)   Drains: None   Specimens: None   Implants: None   Findings: Minimal succus; small enterotomy.   Complications: None.   Condition: Stable       Description of procedure:  I was asked to assist intraoperatively with repair of a bowel injury.  On entering the OR, a single puncture of the small bowel was noted as a result of bowel manipulation.  The small enterotomy was repaired with a single figure of 8 2-0 silk suture with good effect.  The case was then returned to Dr. Morelos for completion of the scheduled case.      Robbi Diaz MD, FACS  Office: [phone]  [facility]   General and Bariatric Surgery

## 2025-03-13 NOTE — ANESTHESIA POSTPROCEDURE EVALUATION
Patient: Lindsay Burch    Procedure: Procedure(s):  ROBOTIC ASSIST TOTAL LAPAROSCOPIC HYSTERECTOMY BILATERAL SALPINGECTOMY CYSTOSCOPY, REPAIR OF SMALL BOWEL       Anesthesia Type:  General    Note:  Disposition: Inpatient   Postop Pain Control: Uneventful            Sign Out: Well controlled pain   PONV: No   Neuro/Psych: Uneventful            Sign Out: Acceptable/Baseline neuro status   Airway/Respiratory: Uneventful            Sign Out: Acceptable/Baseline resp. status   CV/Hemodynamics: Uneventful            Sign Out: Acceptable CV status; No obvious hypovolemia; No obvious fluid overload   Other NRE: NONE   DID A NON-ROUTINE EVENT OCCUR? No           Last vitals:  Vitals Value Taken Time   /82 03/13/25 1516   Temp 37.3  C (99.1  F) 03/13/25 1415   Pulse 59 03/13/25 1517   Resp 13 03/13/25 1517   SpO2 100 % 03/13/25 1517   Vitals shown include unfiled device data.    Electronically Signed By: Alvin Garcia MD  March 13, 2025  3:57 PM

## 2025-03-13 NOTE — ANESTHESIA CARE TRANSFER NOTE
Patient: Lindsay Burch    Procedure: Procedure(s):  ROBOTIC ASSIST TOTAL LAPAROSCOPIC HYSTERECTOMY BILATERAL SALPINGECTOMY CYSTOSCOPY, REPAIR OF SMALL BOWEL       Diagnosis: Abnormal uterine bleeding [N93.9]  Diagnosis Additional Information: No value filed.    Anesthesia Type:   General     Note:    Oropharynx: oropharynx clear of all foreign objects  Level of Consciousness: awake  Oxygen Supplementation: face mask  Level of Supplemental Oxygen (L/min / FiO2): 6  Independent Airway: airway patency satisfactory and stable  Dentition: dentition unchanged  Vital Signs Stable: post-procedure vital signs reviewed and stable  Report to RN Given: handoff report given  Patient transferred to: PACU    Handoff Report: Identifed the Patient, Identified the Reponsible Provider, Reviewed the pertinent medical history, Discussed the surgical course, Reviewed Intra-OP anesthesia mangement and issues during anesthesia, Set expectations for post-procedure period and Allowed opportunity for questions and acknowledgement of understanding      Vitals:  Vitals Value Taken Time   /59 03/13/25 1230   Temp 36.6  C (97.8  F) 03/13/25 1228   Pulse 57 03/13/25 1232   Resp 21 03/13/25 1232   SpO2 100 % 03/13/25 1231   Vitals shown include unfiled device data.    Electronically Signed By: RADHA Gifford CRNA  March 13, 2025  12:34 PM

## 2025-03-13 NOTE — OP NOTE
Name:  Lindsay Burch  Record # [id number]   : 1985  Care Provider: Yaneth Morelos DO   Admit Date:  3/13/2025       Obstetrics Gynecology - Operative Report    DATE OF SERVICE: 3/13/2025     PREOPERATIVE DIAGNOSIS:  abnormal uterine bleeding    PROCEDURE:   Da Franky total laparoscopic assisted hysterectomy, bilateral salpingectomy and cystoscopy: robotic enterotomy repair    FINDINGS:  Normal appearing uterus, ovaries and fallopian tubes was noted was noted, enterotomy noted of small bowel with manipulation. Via cystoscopy normal bladder with eeflux of urine from both ureters at the completion of the procedure.    PHYSICIAN:  Yaneth Morelos DO     ASSISTANT:  Rina Tomlin CSA    ESTIMATED BLOOD LOSS: 30 ml    ANESTHESIA:  General.    SPECIMENS REMOVED:  Uterus, cervix and bilateral tubes.     COMPLICATIONS:  incidental enterotomy as a result of bowel manipulation was noted.    COMMENTS: Lindsay Burch  was met preoperatively where we discussed the procedure and the risks associated with the procedure.   She understood these to be, but not limited to injury to adjacent organs including bladder, bowel, ureter, infection and bleeding.  Patient signed consent and was brought back to the operating room in stable condition.    Patient underwent induction of a general anesthetic, she was carefully prepped and draped for the procedure. Timeout was performed. Bladder was drained with a Wolfe catheter, medium V care uterine manipulator placed into the uterus.      Attention was turned to the abdomen.  An incision was made above the umbilicus.  A Veress needle was introduced with a 2 pop technique, saline drop test confirmed adequate placement. Opening pressure was 2mmHg.   Insufflation was now done until 15mm of pressure were established.  An 7/8 Davinci XI nonbladed trocar was placed above the umbilicus. Two other robotic assist ports were place approximately  to the right of the initial incision and to  the right of that.    A left upper quadrant 5 mm trocar was placed. A final Davinci port was placed to 9-10cm lateral to the umbilical incision. Trendelenburg assistance was used and the davinci was then brought to the patient s bedside.  The da Franky was then docked.  The maryland bipolar forceps and PK bipolar forceps were placed at the left da Franky port, the monopolar scissors placed in right side of the body and I took my turn to the da Franky console.     The procedure began with identifying the anatomy.  Normal appearing uterus, ovaries and fallopian tubes was noted was noted, enterotomy noted of small bowel with manipulation. Dr. Lopez took over robotic controls for repair, please see his operative note.     Once repaired attention was then returned to the hysterectomy. The tube on the left side was cauterized, transected and removed.  The utero-ovarian ligament on the left side was then cauterized and transected.  The round ligament on the left side was cauterized and transected creating an anterior and posterior leave of the broad ligament. The anterior bladder flap was created.  The uterine vasculature was then skeletonized and cauterized. Attention was then turned to the right side.    This entire step was than repeated on the right side.  At this junction, the uterine vasculature on both sides near the uterocervical isthmus were  transected.  This cauterization and transection was continued along the cervix until the level of the cardinal ligament.  At this junction anterior colpotomy and posterior colpotomy were performed utilizing the monopolar scissors. The uterus and cervix were then delivered through the vagina.  The vaginal cuff was closed with 0 v-lock suture in a running fashion from left apex to right and then a second continuous layer moving back to the left apex. Copious irrigation was utilized and hemostasis was noted. Surgicel powder was placed over the vaginal cuff for additional  hemostasis.       At this junction, the procedure  was complete.  Sponge, lap and needle counts were correct x 2.  I took my turn to cystoscopy, noting normal ureter and bladder anatomy. Strong urine eflux bilaterally was noted from both ureteral orfices.  The trocars were removed and the kash was removed from the abdomen.    Skin was closed with 4-0 monocryl and dermabond glue was applied. The incision sites were injected with 25% marcaine.  She was brought to the recovery in stable condition.    Yaneth Morelos DO, [facility]  [phone]

## 2025-03-13 NOTE — INTERVAL H&P NOTE
I have reviewed the surgical (or preoperative) H&P that is linked to this encounter, and examined the patient. There are no significant changes    Yaneth Morelos DO, [facility] [place] [facility]  [phone]

## 2025-03-13 NOTE — ANESTHESIA PROCEDURE NOTES
Airway       Patient location during procedure: OR       Procedure Start/Stop Times: 3/13/2025 9:59 AM  Staff -        Anesthesiologist:  Alvin Garcia MD       CRNA: Keyonna Ahuja APRN CRNA       Performed By: CRNAIndications and Patient Condition       Indications for airway management: kayla-procedural       Induction type:intravenous       Mask difficulty assessment: 1 - vent by mask    Final Airway Details       Final airway type: endotracheal airway       Successful airway: ETT - single  Endotracheal Airway Details        ETT size (mm): 7.0       Cuffed: yes       Cuff volume (mL): 7       Successful intubation technique: direct laryngoscopy       DL Blade Type: Nye 2       Grade View of Cords: 1       Adjucts: stylet       Position: Right       Measured from: gums/teeth       Secured at (cm): 21       Bite block used: None    Post intubation assessment        Placement verified by: capnometry, equal breath sounds and chest rise        Number of attempts at approach: 1       Number of other approaches attempted: 0       Secured with: tape       Ease of procedure: easy       Dentition: Intact and Unchanged    Medication(s) Administered   Medication Administration Time: 3/13/2025 9:59 AM

## 2025-03-13 NOTE — PROVIDER NOTIFICATION
Dr Garcia notified of pain control issues, pt getting apneic w/ lower RR when given narcotics. Very anxious and tearful, continues to complain of pain. Dr Garcia to bedside, orders placed for IV mag 2g, IV tylenol, Atarax and IV robaxin.   See MAR for administration.   1511: Pt stating feeling much better, VS WDL, on RA, calm and cooperative, pain tolerable per pt report. Nonverbal indicators absent for pain. Plan to transfer to Phase II

## 2025-03-27 LAB
PATH REPORT.COMMENTS IMP SPEC: NORMAL
PATH REPORT.COMMENTS IMP SPEC: NORMAL
PATH REPORT.FINAL DX SPEC: NORMAL
PATH REPORT.GROSS SPEC: NORMAL
PATH REPORT.MICROSCOPIC SPEC OTHER STN: NORMAL
PATH REPORT.RELEVANT HX SPEC: NORMAL
PHOTO IMAGE: NORMAL

## 2025-04-14 ENCOUNTER — TELEPHONE (OUTPATIENT)
Dept: VASCULAR SURGERY | Facility: CLINIC | Age: 40
End: 2025-04-14
Payer: COMMERCIAL

## 2025-04-14 DIAGNOSIS — I83.813 VARICOSE VEINS OF BOTH LOWER EXTREMITIES WITH PAIN: Primary | ICD-10-CM

## 2025-04-14 RX ORDER — CLONIDINE HYDROCHLORIDE 0.1 MG/1
TABLET ORAL
Qty: 1 TABLET | Refills: 0 | Status: SHIPPED | OUTPATIENT
Start: 2025-04-14

## 2025-04-14 RX ORDER — LORAZEPAM 1 MG/1
TABLET ORAL
Qty: 3 TABLET | Refills: 0 | Status: SHIPPED | OUTPATIENT
Start: 2025-04-14

## 2025-04-14 NOTE — TELEPHONE ENCOUNTER
Patient called requesting a call back. She has some follow up questions about her procedure.    Georgette CARDENAS [facility]

## 2025-04-14 NOTE — TELEPHONE ENCOUNTER
4/14/2025    Vein Clinic Preoperative Nurse Call    Procedure: Left leg VenaSeal ASV(med nec), Right leg VenaSeal GSV(med nec), Left leg 10-20 stab phlebs(med nec), u/s sclero (med nec) 1/2 session exp. 2/13/26   Date: Thursday 4/24/2025  Surgeon: Dr. Corley  Time: 0800  Check in time: 0700    Called patient and left a detailed message. Informed patient: when to check in (0700) to sign consent, to bring their preop medications in their original bottle with them (3mg ativan, 0.1mg clonidine). Patient will take the medications after signing the consent to the procedure. Instructed patient to wear loose-fitting comfortable clothing, and bring their compression hose. Ensured patient has a /someone that will be responsible for them the rest of the day. Once procedure is completed, we will keep patient in recovery for 30-45 mins, and call  with aftercare instructions. Informed patient, that if possible, they should sit in the backseat to elevate their leg on the ride home.    Pt needs Thigh High compression hose for procedure. Status of the hose: patient has thigh high compression hose.      Patient understands if they have any of the following symptoms (fever, cough, shortness of breath, rash), they need to notify us immediately as they may need to cancel their procedure and reschedule for a later date.    Gave patient our call back number if any further questions or concerns.    Guadalupe Chu RN  [facility]

## 2025-04-15 NOTE — TELEPHONE ENCOUNTER
Called pt back and reviewed over most procedure information and instructed pt to review the paperwork in her AVS from her last virtual visit with Dr. Corley.    Pt will call us back if she thinks of any other questions.    Gayathri Nolan RN  [facility]  Vein Clinic

## 2025-04-23 ENCOUNTER — MYC MEDICAL ADVICE (OUTPATIENT)
Dept: INTERNAL MEDICINE | Facility: CLINIC | Age: 40
End: 2025-04-23
Payer: COMMERCIAL

## 2025-04-23 DIAGNOSIS — N28.9 RENAL INSUFFICIENCY: Primary | ICD-10-CM

## 2025-04-23 NOTE — PATIENT INSTRUCTIONS
Post-Procedure Instructions:                                Venaseal and Phlebectomies      Post-Op Day Zero - The Day of Your Procedure:  1.  Medication for Pain Control and Inflammation Control   - The numbing medication injected during your procedure will last for several hours. The pre-procedure    tablets may make you very sleepy and you might not remember everything from the procedure or from    the day. This will usually wear off by the next day.   - Ibuprofen: If tolerated, take ibuprofen to reduce inflammation whether or not you have pain. For    three days, take 2 tablets (200mg each) with every meal and at bedtime with a snack. If your pain is not    controlled with ibuprofen, you may take prescription pain medication (such as Norco), if prescribed.   - Aspirin: Take one 325mg aspirin to reduce blood clot risk.   - You may resume taking any medications you were taking before your procedure.  2.  Activity   - Rest with your leg(s) elevated above your heart. This will prevent from a lot of swelling and  bleeding.               You do not need to elevate your leg(s) while sleeping at night. You may go upstairs, sit up to eat, use the               bathroom, and take several five minute walks. Otherwise, keep your leg(s) elevated. Minimize the amount               of time you are up on your feet to about 30 minutes at a time.  3.  Bandages   - The incision sites will be covered with soft bandages and an ACE wrap. Keep your bandages on and    dry for 48 hours. The ACE should provide  snug  compression, but should not cause pain or numbness    in the toes. If you have significant discomfort or your toes become cold or numb, unwrap your ACE and    rewrap with less tension starting at the toes wrapping upward.  4.  Incisions   - Bleeding: You may see some incision sites that are oozing through the bandages. This is not unusual    and can be managed with Rest, Ice, Compression and Elevation (RICE). Apply ice and  firm pressure    directly to the site that is bleeding and rest with your leg(s) elevated above your heart for 20-30 minutes.    Post-Op Day One:  1.  Medication   - Ibuprofen and Aspirin: Continue the same as the Day of Your Procedure.  2.  Activity   - We would like you to get up at least six times and walk around for short periods of time unless it is    causing you pain. You should be on your feet no more than 90 minutes at a time. Elevate your leg above    your heart when you are not walking.  3.  Bandages   - Your bandages must be kept on and dry for 48 hours.  4.  Driving   - You may resume driving when you can do so safely. Do not drive if you are taking narcotic pain medication.    Post-Op Day Two:    1. Medication   - Ibuprofen and Aspirin: Continue the same as the Day of Your Procedure.  2. Activity   - Walk as tolerated. Elevate as much as possible when not walking.  3. Bandages    - Remove ACE wrap and padding. You may shower. Your doctor may request you wear a stocking    during the day.  4. Incisions   - Your leg(s) will be bruised; there may be swelling, hard knots under the skin and possibly some    numbness. These will resolve over time. If you see  hair-like  strings coming out of your incisions, do    not pull them (this will only cause pain/discomfort). We will trim them when you come back for your    follow-up appointment.  5. Call Us If:   - You see any areas on your leg that are red and angry in appearance.   - You notice any drainage that is milky or cloudy in appearance or that has a foul odor.   - You run a temperature of 100.5 or greater.    Post-Op Day Three:  You will have a follow up appointment 2-4 days post-procedure. At this appointment, you will have an ultrasound and we will check your incisions.    __________________________________________________________________________________________    The Two Weeks Following Your Procedure  1.  Skin Care   - Do not use any lotions, creams,  or powders on your leg for 14 days or until the incisions have healed.   - Do not soak in a bathtub, whirlpool, or hot tub or go swimming for 14 days or until your incisions have  healed.  2. Medications   - You may use ibuprofen or Tylenol as needed for pain or discomfort.  3. Activity   - Do not lift over 25 pounds. After about ten days you may resume exercise such as aerobics, running,    tennis or weight lifting. Use your common sense and ease back into your exercise routine slowly.   - You may feel a cord-like tightness along the inside of your leg. Gentle stretching can be helpful.    Six Week Appointment   At your six week appointment, you will see your surgeon for an exam and evaluation. This office visit    will be scheduled when you return for Post-op Day Three Return Appointment.     Return to Work  1.  If you work outside the home, you may return to work in a few days depending on the extent of your  procedure, how you tolerate it, and the type of work you perform.  2.  Paperwork: If your employer requires paperwork or you would like a letter written to your employer, please let us know. We will complete disability type forms at no charge. Please allow five business days for forms to be completed.       SCLEROTHERAPY AFTER CARE  Immediately:  After treatment, walk for 10-15 minutes before getting in your car.  If your trip home is more than 1 hour, stop and walk around for 5-10 minutes. Avoid sitting or standing for extended periods.   First 24 hours: Wear your compression continuously, even while in bed. After the 24 hours, you may shower if you want to. Put your hose back on, unless you are going to bed. You should NOT wear compression to bed after the first 24 hours. You may fly the next day, but wear your compression.   For 5 days: Wear the compression hose for waking hours only. You may continue to wear them longer than 5 days if you prefer.   For days 5-7: Walking is encouraged, as it promotes  efficient circulation in your veins. You may do activities that raise your heart rate, but do NOT run, jog, do high impact aerobics, or weight lifting. After 7 days, no activity restrictions.    Shaving: Wait a few days to shave or apply lotion.   Bathing: Do NOT take hot baths or sit in a hot tub for 7-10 days.    For 1 year: Wear SPF 30 sunscreen on your legs when in the sun. This is very important! It helps prevent darkening of the skin at the injection sites.   Medications: You may resume your usual medications, including aspirin or ibuprofen.    Common Things to Expect  -  Compression must be worn for the first 24 hours and then during the day for 5-7 days.    -  If larger veins are treated with ultrasound-guided sclerotherapy, you will have redness, firmness, tenderness, and swelling.  This firmness and tenderness may take 3-6 months to resolve. Ibuprofen and compression hose will aid in this process.    -  You will have bruising that can last up to 3 weeks. Most fading of the veins will occur between 3 and 6 weeks after treatment.    -  You may notice brown discoloration (hyperpigmentation) at the treatment site.  This should fade with time, but will take 3 months to 1 year to fully heal.     -  Some treated veins may look darker because of trapped blood within the vein. This trapped blood can be removed at a minimum of 1 month following treatment. Larger veins are more likely to develop trapped blood.    -  It is very important for you to use at least SPF 30 sunscreen in order to help prevent the discoloration of your skin.    -  Migraines rarely occur following sclerotherapy, but are more likely in patients with a history of migraines.  Treat as you would any other migraine.

## 2025-04-24 ENCOUNTER — OFFICE VISIT (OUTPATIENT)
Dept: VASCULAR SURGERY | Facility: CLINIC | Age: 40
End: 2025-04-24
Payer: COMMERCIAL

## 2025-04-24 VITALS — DIASTOLIC BLOOD PRESSURE: 71 MMHG | HEART RATE: 80 BPM | OXYGEN SATURATION: 95 % | SYSTOLIC BLOOD PRESSURE: 108 MMHG

## 2025-04-24 DIAGNOSIS — I83.813 VARICOSE VEINS OF BOTH LOWER EXTREMITIES WITH PAIN: Primary | ICD-10-CM

## 2025-04-24 NOTE — LETTER
4/24/2025      Lindsay Burch  [address]  [place] [address]      Dear Colleague,    Thank you for referring your patient, Lindsay Burch, to the [facility] [place]. Please see a copy of my visit note below.    Pre-procedure Nursing Note    Lindsay Burch presents to clinic for Vein Procedure    /Person Responsible for Patient: Lucas (Spouse)  Phone Number: [phone]    Prophylactic Medication:N/A   Sedation Medication: Ativan, 3mg ,   Time Taken: 0700 and Clonidine, 0.1mg,   Time Taken: 0700  Compression Stockings: Patient brought with today.  The procedure is being performed on BLE.  Patient understanding of procedure matches consent? YES    Patient's pre-procedure medications verified by ARIELLE Trinh.    Gayathri Nolan RN on 4/24/2025 at 7:00 AM        Vein Clinic Procedure Note    Preoperative diagnosis:    1.  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  Right greater saphenous vein reflux    Post operative diagnosis:  Same    Procedure:  1.  Aborted access of the left anterior accessory saphenous vein due to short segment and tortuosity  2.  Sclerotherapy of proximal left anterior accessory saphenous vein and varicose veins of proximal thigh  3.  VenaSeal closure of right greater saphenous vein from upper thigh to mid calf  4.  Phlebectomy of the left proximal thigh, distal thigh, lateral knee and lateral calf varicose veins    Preoperative medications: 3 mg ativan, 0.1 mg clonidine      Procedures    Operative description  Indications: Bilateral lower extremity heaviness, venous reflux of right greater saphenous vein, left anterior accessory saphenous vein and associated varicose veins    Procedure: Informed consent obtained.  Patient mapped and marked.  Sterile prep as per protocol.    Using ultrasound, I mapped the left anterior accessory saphenous vein which was a very short and tortuous segment.  With the subcutaneous  lidocaine, there was significant spasm.  The vein was not amenable to accepting a sheath, I attempted to access this with an 18-gauge IV catheter but decided that sclerotherapy of the varicose veins and anterior accessory saphenous vein in the upper left thigh would suffice and have less risk of complication.    I did access the upper thigh varicose veins under ultrasound and inject 1% foamed polidocanol.  I could see the sclerosant travel into the proximal left anterior accessory saphenous vein appropriately.    We then accessed the right greater saphenous vein at the mid shin using Seldinger technique and under ultrasound guidance advanced the catheter to 6 cm distal to the saphenofemoral junction.    We closed the right greater saphenous vein with VenaSeal closure as per IFU.    We then moved back to to the left thigh and leg.  I used the ultrasound and the sclerosant was nicely still in the proximal AASV and varicose veins.  We then chose to puncture about one half of the marked sites for microphlebectomy in order to limit bruising and scarring.  She had very long segments of varicose veins that were able to be extracted from each puncture obviating the need to dig at each marked spot.     Stab phlebectomy: 26    EBL: 0.5 mL        2025     8:16 AM   Flowsheet Data   Procedure Start Time: 08:16   Prep: Chloraprep   Side: Bilateral   Tx Length (cm): RIGHT GSV: 54   Junction (cm): RIGHT GSV: 6   # PHLEB Sites: LEFT LE   Cyanoacrylate used (ml): TOTAL: 2.5   Sedation taken: Yes   Pre Pt. Physical / Cognitive Limitations: WNL   TOTAL Local anesthesia Injected (ml): 7   Max Volume Local Anesthesia (ml): 11   TOTAL Tumescent Injected volume (ml): 200   Max Volume Tumescent (ml): 286   Post Pt. Physical / Cognitive Limitations: WNL   Procedure End Time: 09:27   D/C Instructions given, states readiness to leave and escorted to car: Yes     Tumescent Concentration: Total Volume: 572ml - 0.9% Sodium Chloride  500ml Bag +  Lidocaine 1% with Epinephrine 1:100,000: 60ml + 8.4% Sodium Bicarbonate: 12ml    Patient's blood pressure, pulse and pulse oximetry were continuously monitored throughout the procedure under my direct supervision and was stable during the procedure.    Patient recovered in our suites and discharged home with their family with postoperative instructions and follow up.     Ángel Corley MD      Again, thank you for allowing me to participate in the care of your patient.        Sincerely,        Ángel Corley MD    Electronically signed

## 2025-04-24 NOTE — PROGRESS NOTES
Vein Clinic Procedure Note    Preoperative diagnosis:    1.  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  Right greater saphenous vein reflux    Post operative diagnosis:  Same    Procedure:  1.  Aborted access of the left anterior accessory saphenous vein due to short segment and tortuosity  2.  Sclerotherapy of proximal left anterior accessory saphenous vein and varicose veins of proximal thigh  3.  VenaSeal closure of right greater saphenous vein from upper thigh to mid calf  4.  Phlebectomy of the left proximal thigh, distal thigh, lateral knee and lateral calf varicose veins    Preoperative medications: 3 mg ativan, 0.1 mg clonidine      Procedures    Operative description  Indications: Bilateral lower extremity heaviness, venous reflux of right greater saphenous vein, left anterior accessory saphenous vein and associated varicose veins    Procedure: Informed consent obtained.  Patient mapped and marked.  Sterile prep as per protocol.    Using ultrasound, I mapped the left anterior accessory saphenous vein which was a very short and tortuous segment.  With the subcutaneous lidocaine, there was significant spasm.  The vein was not amenable to accepting a sheath, I attempted to access this with an 18-gauge IV catheter but decided that sclerotherapy of the varicose veins and anterior accessory saphenous vein in the upper left thigh would suffice and have less risk of complication.    I did access the upper thigh varicose veins under ultrasound and inject 1% foamed polidocanol.  I could see the sclerosant travel into the proximal left anterior accessory saphenous vein appropriately.    We then accessed the right greater saphenous vein at the mid shin using Seldinger technique and under ultrasound guidance advanced the catheter to 6 cm distal to the saphenofemoral junction.    We closed the right greater saphenous vein with VenaSeal closure as per IFU.    We then moved back  to to the left thigh and leg.  I used the ultrasound and the sclerosant was nicely still in the proximal AASV and varicose veins.  We then chose to puncture about one half of the marked sites for microphlebectomy in order to limit bruising and scarring.  She had very long segments of varicose veins that were able to be extracted from each puncture obviating the need to dig at each marked spot.     Stab phlebectomy: 26    EBL: 0.5 mL        2025     8:16 AM   Flowsheet Data   Procedure Start Time: 08:16   Prep: Chloraprep   Side: Bilateral   Tx Length (cm): RIGHT GSV: 54   Junction (cm): RIGHT GSV: 6   # PHLEB Sites: LEFT LE   Cyanoacrylate used (ml): TOTAL: 2.5   Sedation taken: Yes   Pre Pt. Physical / Cognitive Limitations: WNL   TOTAL Local anesthesia Injected (ml): 7   Max Volume Local Anesthesia (ml): 11   TOTAL Tumescent Injected volume (ml): 200   Max Volume Tumescent (ml): 286   Post Pt. Physical / Cognitive Limitations: WNL   Procedure End Time: 09:27   D/C Instructions given, states readiness to leave and escorted to car: Yes     Tumescent Concentration: Total Volume: 572ml - 0.9% Sodium Chloride 500ml Bag +  Lidocaine 1% with Epinephrine 1:100,000: 60ml + 8.4% Sodium Bicarbonate: 12ml    Patient's blood pressure, pulse and pulse oximetry were continuously monitored throughout the procedure under my direct supervision and was stable during the procedure.    Patient recovered in our suites and discharged home with their family with postoperative instructions and follow up.     Ángel Corley MD

## 2025-04-24 NOTE — PROGRESS NOTES
Pre-procedure Nursing Note    Lindsay Burch presents to clinic for Vein Procedure    /Person Responsible for Patient: Lucas (Spouse)  Phone Number: [phone]    Prophylactic Medication:N/A   Sedation Medication: Ativan, 3mg ,   Time Taken: 0700 and Clonidine, 0.1mg,   Time Taken: 0700  Compression Stockings: Patient brought with today.  The procedure is being performed on BLE.  Patient understanding of procedure matches consent? YES    Patient's pre-procedure medications verified by ARIELLE Trinh.    Gayathri Nolan RN on 4/24/2025 at 7:00 AM

## 2025-04-28 ENCOUNTER — ANCILLARY PROCEDURE (OUTPATIENT)
Dept: ULTRASOUND IMAGING | Facility: CLINIC | Age: 40
End: 2025-04-28
Attending: INTERNAL MEDICINE
Payer: COMMERCIAL

## 2025-04-28 ENCOUNTER — ALLIED HEALTH/NURSE VISIT (OUTPATIENT)
Dept: VASCULAR SURGERY | Facility: CLINIC | Age: 40
End: 2025-04-28
Attending: INTERNAL MEDICINE
Payer: COMMERCIAL

## 2025-04-28 DIAGNOSIS — I83.813 VARICOSE VEINS OF BOTH LOWER EXTREMITIES WITH PAIN: ICD-10-CM

## 2025-04-28 DIAGNOSIS — Z09 POSTOP CHECK: Primary | ICD-10-CM

## 2025-04-28 PROCEDURE — 93971 EXTREMITY STUDY: CPT | Mod: RT | Performed by: INTERNAL MEDICINE

## 2025-04-28 NOTE — PATIENT INSTRUCTIONS
You had a VenaSeal procedure, where one or more veins were closed.     Activities: Resume normal activities as tolerated.  Bathing: Do not take baths, sit in a hot tub, or go swimming for one week. Showering is fine.    Call Us If:    You have intense or bothersome itching of your leg  You see any areas on your leg that are red and angry in appearance.  You notice any drainage that is milky or cloudy in appearance or that has a foul odor.  You run a temperature of 100.5 or greater.      Vein Removal (Phlebectomy)  Common Things to Expect    Small lumps may develop beneath phlebectomy sites. This is a normal step in healing.  These should not be painful, but may be tender to the touch. It can take 6 weeks to 3 months for these lumps/firmness to resolve. About 3-4 weeks after your phlebectomies, when the bruising has subsided and the incisions are healed, gentle massage will aid in the healing of these small lumps.  Bruising will look worse before it looks better and can last for 4-6 weeks.  Ankle swelling is not uncommon and may last 4-6 weeks.   Numbness will get better with time, but may take 3 months to a year to resolve.  You may notice that the skin on your legs has become ultra-sensitive to touch. For example, the weight of your sheets may feel painful. This usually resolves in 6 weeks.    For 2 weeks, no weight lifting over 25lbs, no running, and no vigorous aerobic exercise. After this time, ease back into your normal activities. If you do too much too soon, you will have more pain, swelling, and bruising. Keep in mind your body is still healing.  For 2 weeks, do not shave your legs or use lotions, powders, creams to allow proper healing of vein access sites.    Wearing compression hose is not required, but highly recommended following phlebectomies as this will aid in the healing process. Compression will minimize your pain, swelling, and bruising.      If you are experiencing any of the following symptoms,  please seek immediate medical attention at your local emergency department.  - Significant pain in the back of the calf possibly with difficulty walking  - Significant swelling and/or tenderness in the back of the calf  - Redness that continues to spread  - Chest pain and/or shortness of breath       SCLEROTHERAPY AFTER CARE    For 5 days: Wear the compression hose for waking hours only. You may continue to wear them longer than 5 days if you prefer.   For days 5-7: Walking is encouraged, as it promotes efficient circulation in your veins. You may do activities that raise your heart rate, but do NOT run, jog, do high impact aerobics, or weight lifting. After 7 days, no activity restrictions.    Shaving: Wait a few days to shave or apply lotion.   Bathing: Do NOT take hot baths or sit in a hot tub for 7-10 days.    For 1 year: Wear SPF 30 sunscreen on your legs when in the sun. This is very important! It helps prevent darkening of the skin at the injection sites.   Medications: You may resume your usual medications, including aspirin or ibuprofen.    Common Things to Expect  -  Compression must be worn for the first 24 hours and then during the day for 5-7 days.    -  If larger veins are treated with ultrasound-guided sclerotherapy, you will have redness, firmness, tenderness, and swelling.  This firmness and tenderness may take 3-6 months to resolve. Ibuprofen and compression hose will aid in this process.    -  You will have bruising that can last up to 3 weeks. Most fading of the veins will occur between 3 and 6 weeks after treatment.    -  You may notice brown discoloration (hyperpigmentation) at the treatment site.  This should fade with time, but will take 3 months to 1 year to fully heal.     -  Some treated veins may look darker because of trapped blood within the vein. This trapped blood can be removed at a minimum of 1 month following treatment. Larger veins are more likely to develop trapped blood.    -   It is very important for you to use at least SPF 30 sunscreen in order to help prevent the discoloration of your skin.    -  Migraines rarely occur following sclerotherapy, but are more likely in patients with a history of migraines.  Treat as you would any other migraine.

## 2025-04-28 NOTE — PROGRESS NOTES
April 28, 2025    Vein Clinic Postoperative Nurse Note    Patient is here for her 72 hour postoperative visit.    Procedure: Right leg VenaSeal GSV(med nec), Left leg 10-20 stab phlebs(med nec), u/s sclero (med nec) 1/2 session (exp. 2/13/26) (did NOT do Left ASV Venaseal)   Procedure Date: 4/24/25  Surgeon: Dr. Corley    Ultrasound Result: The right GSV is closed 11.5 mm from the SFJ to the mid calf. No evidence of RLE DVT.    Physical Exam: Incisions are approximated without signs of infection.  Ecchymosis: moderate left leg (26 phlebs)  Swelling: minimal  Paresthesia: pt denies    Patient Questions or Concerns: Pt is doing well and has no concerns at this time.    Pt is able to don her thigh high compression hose. Explained to pt she technically only has to wear the thigh high stocking on her left leg, but can on the right as well if she would prefer.    Reviewed postoperative instructions with patient and provided her with written material of common things to expect from her procedure.    Patient's Next Vein Clinic Appointment: 6 week post op with Dr. Corley (6/16/25).    Gayathri Nolan RN  [facility]

## 2025-05-08 ENCOUNTER — LAB (OUTPATIENT)
Dept: LAB | Facility: CLINIC | Age: 40
End: 2025-05-08
Payer: COMMERCIAL

## 2025-05-08 DIAGNOSIS — N28.9 RENAL INSUFFICIENCY: ICD-10-CM

## 2025-05-08 LAB
CREAT SERPL-MCNC: 0.89 MG/DL (ref 0.51–0.95)
EGFRCR SERPLBLD CKD-EPI 2021: 84 ML/MIN/1.73M2

## 2025-05-12 ENCOUNTER — ANCILLARY PROCEDURE (OUTPATIENT)
Dept: MAMMOGRAPHY | Facility: CLINIC | Age: 40
End: 2025-05-12
Attending: INTERNAL MEDICINE
Payer: COMMERCIAL

## 2025-05-12 DIAGNOSIS — Z12.31 VISIT FOR SCREENING MAMMOGRAM: ICD-10-CM

## 2025-05-12 PROCEDURE — 77063 BREAST TOMOSYNTHESIS BI: CPT | Mod: TC | Performed by: RADIOLOGY

## 2025-05-12 PROCEDURE — 77067 SCR MAMMO BI INCL CAD: CPT | Mod: TC | Performed by: RADIOLOGY

## 2025-05-23 PROBLEM — Z90.710 S/P HYSTERECTOMY: Status: ACTIVE | Noted: 2025-05-23

## 2025-06-16 ENCOUNTER — OFFICE VISIT (OUTPATIENT)
Dept: VASCULAR SURGERY | Facility: CLINIC | Age: 40
End: 2025-06-16
Payer: COMMERCIAL

## 2025-06-16 DIAGNOSIS — I83.813 VARICOSE VEINS OF BOTH LOWER EXTREMITIES WITH PAIN: Primary | ICD-10-CM

## 2025-06-16 PROCEDURE — 99213 OFFICE O/P EST LOW 20 MIN: CPT | Performed by: INTERNAL MEDICINE

## 2025-06-16 NOTE — LETTER
6/16/2025      Lindsay Burch  [address]  [place] [address]      Dear Colleague,    Thank you for referring your patient, Lindsay Burch, to the [facility] [place]. Please see a copy of my visit note below.    6-week follow-up from procedure 4/24/2025    Procedure:  1.  Aborted access of the left anterior accessory saphenous vein due to short segment and tortuosity  2.  Sclerotherapy of proximal left anterior accessory saphenous vein and varicose veins of proximal thigh  3.  VenaSeal closure of right greater saphenous vein from upper thigh to mid calf  4.  Phlebectomy of the left proximal thigh, distal thigh, lateral knee and lateral calf varicose veins    Patient is delighted with the way she feels.  Her leg has healed up nicely.  Her legs feel like they have more energy and are less heavy.  I do not see any significant residual varicosities in the left thigh.    Follow-up in 6 months.    20 minutes spent today reviewing chart, discussing with patient, coordinating care and postvisit charting.      Again, thank you for allowing me to participate in the care of your patient.        Sincerely,        Ángel Corley MD    Electronically signed

## 2025-06-16 NOTE — PROGRESS NOTES
6-week follow-up from procedure 4/24/2025    Procedure:  1.  Aborted access of the left anterior accessory saphenous vein due to short segment and tortuosity  2.  Sclerotherapy of proximal left anterior accessory saphenous vein and varicose veins of proximal thigh  3.  VenaSeal closure of right greater saphenous vein from upper thigh to mid calf  4.  Phlebectomy of the left proximal thigh, distal thigh, lateral knee and lateral calf varicose veins    Patient is delighted with the way she feels.  Her leg has healed up nicely.  Her legs feel like they have more energy and are less heavy.  I do not see any significant residual varicosities in the left thigh.    Follow-up in 6 months.    20 minutes spent today reviewing chart, discussing with patient, coordinating care and postvisit charting.

## 2025-07-25 ENCOUNTER — ANCILLARY PROCEDURE (OUTPATIENT)
Dept: ULTRASOUND IMAGING | Facility: CLINIC | Age: 40
End: 2025-07-25
Attending: INTERNAL MEDICINE
Payer: COMMERCIAL

## 2025-07-25 DIAGNOSIS — I83.812 VARICOSE VEINS OF LEFT LOWER EXTREMITY WITH PAIN: ICD-10-CM

## 2025-07-25 PROCEDURE — 93971 EXTREMITY STUDY: CPT | Mod: LT | Performed by: INTERNAL MEDICINE

## 2025-08-04 ENCOUNTER — VIRTUAL VISIT (OUTPATIENT)
Dept: VASCULAR SURGERY | Facility: CLINIC | Age: 40
End: 2025-08-04
Payer: COMMERCIAL

## 2025-08-04 DIAGNOSIS — I87.2 VENOUS REFLUX: Primary | ICD-10-CM

## 2025-08-04 DIAGNOSIS — R20.0 LEFT LEG NUMBNESS: ICD-10-CM

## 2025-08-18 DIAGNOSIS — I83.812 VARICOSE VEINS OF LEFT LOWER EXTREMITY WITH PAIN: Primary | ICD-10-CM

## (undated) DEVICE — Device

## (undated) DEVICE — SYR 10ML LL W/O NDL 302995

## (undated) DEVICE — POSITIONING KIT THE PINK PAD XL 40X20X1IN 40583 (COI)

## (undated) DEVICE — TROCAR PORT BLADELESS 5X100MM IAS5-100LP

## (undated) DEVICE — SU DERMABOND ADVANCED .7ML DNX12

## (undated) DEVICE — ANTIFOG SOLUTION SEE SHARP 150M TROCAR SWABS 30978 (COI)

## (undated) DEVICE — SURGICEL ENDOSCOPIC APPLICATOR FOR ORC POWDER 3123SPEA

## (undated) DEVICE — NEEDLE HYPO MAGELLAN SAFETY 22GA 1 1/2IN 8881850215

## (undated) DEVICE — SOL RINGERS LACTATED 1000ML BAG 2B2324X

## (undated) DEVICE — TUBING FILTER TRI-LUMEN AIRSEAL ASC-EVAC1

## (undated) DEVICE — ELECTRODE PATIENT RETURN ADULT L10 FT 2 PLATE CORD 0855C

## (undated) DEVICE — SOLUTION IV 2B0304X STRL WATER 1000ML

## (undated) DEVICE — VIAL DECANTER STERILE WHITE DYNJDEC06

## (undated) DEVICE — BLADE KNIFE SURG 11 371111

## (undated) DEVICE — SU MONOCRYL+ 4-0 18IN PS2 UND MCP496G

## (undated) DEVICE — DAVINCI XI OBTURATOR BLADELESS 8MM 470359

## (undated) DEVICE — PACK TRENGUARD 450 PROCEDURAL 2065406

## (undated) DEVICE — TUBING IRR LG BORE TUBE DRIP CHMBR 2 BG 94IN 313003

## (undated) DEVICE — PREP POVIDONE-IODINE 7.5% SCRUB 4OZ BOTTLE MDS093945

## (undated) DEVICE — LUBRICANT INST ELECTROLUBE EL101

## (undated) DEVICE — DAVINCI XI SEAL UNIVERSAL 5-12MM 470500

## (undated) DEVICE — SUCTION MANIFOLD NEPTUNE 2 SYS 1 PORT 702-025-000

## (undated) DEVICE — DAVINCI XI DRAPE ARM 470015

## (undated) DEVICE — SUCTION STRYKERFLOW II 250-070-500

## (undated) DEVICE — ENDO SHEARS RENEW LAP ENDOCUT SCISSOR TIP 16.5MM 3142

## (undated) DEVICE — SYR 50ML SLIP TIP W/O NDL 309654

## (undated) DEVICE — DAVINCI HOT SHEARS TIP COVER  400180

## (undated) DEVICE — DRAPE POUCH INSTRUMENT 3 POCKET 1018L

## (undated) DEVICE — DAVINCI XI DRAPE COLUMN 470341

## (undated) DEVICE — GLOVE PI ULTRATCH M LF SZ 6.5 PF CUFF TEXT STRL LF 42665

## (undated) DEVICE — PREP CHLORAPREP 26ML TINTED HI-LITE ORANGE 930815

## (undated) DEVICE — SURGICEL POWDER ABSORBABLE HEMOSTAT 3GM 3013SP

## (undated) DEVICE — PROTECTOR ARM STANDARD ONE STEP 40433 (COI)

## (undated) DEVICE — CUSTOM PACK DA VINCI GYN SMA5BDVHEA

## (undated) DEVICE — SOL WATER IRRIG 1000ML BOTTLE 2F7114

## (undated) DEVICE — NDL INSUFFLATION 13GA 120MM C2201

## (undated) DEVICE — SU WND CLOSURE VLOC 180 ABS 0 12" GS-21 VLOCL0316

## (undated) DEVICE — PREP POVIDONE-IODINE 10% SOLUTION 4OZ BOTTLE MDS093944

## (undated) RX ORDER — PROPOFOL 10 MG/ML
INJECTION, EMULSION INTRAVENOUS
Status: DISPENSED
Start: 2025-03-13

## (undated) RX ORDER — LIDOCAINE HYDROCHLORIDE 10 MG/ML
INJECTION, SOLUTION EPIDURAL; INFILTRATION; INTRACAUDAL; PERINEURAL
Status: DISPENSED
Start: 2025-03-13

## (undated) RX ORDER — DEXAMETHASONE SODIUM PHOSPHATE 4 MG/ML
INJECTION, SOLUTION INTRA-ARTICULAR; INTRALESIONAL; INTRAMUSCULAR; INTRAVENOUS; SOFT TISSUE
Status: DISPENSED
Start: 2025-03-13

## (undated) RX ORDER — GLYCOPYRROLATE 0.2 MG/ML
INJECTION, SOLUTION INTRAMUSCULAR; INTRAVENOUS
Status: DISPENSED
Start: 2025-03-13

## (undated) RX ORDER — ONDANSETRON 2 MG/ML
INJECTION INTRAMUSCULAR; INTRAVENOUS
Status: DISPENSED
Start: 2025-03-13

## (undated) RX ORDER — FENTANYL CITRATE 50 UG/ML
INJECTION, SOLUTION INTRAMUSCULAR; INTRAVENOUS
Status: DISPENSED
Start: 2025-03-13

## (undated) RX ORDER — KETOROLAC TROMETHAMINE 30 MG/ML
INJECTION, SOLUTION INTRAMUSCULAR; INTRAVENOUS
Status: DISPENSED
Start: 2025-03-13

## (undated) RX ORDER — DEXAMETHASONE SODIUM PHOSPHATE 10 MG/ML
INJECTION, SOLUTION INTRAMUSCULAR; INTRAVENOUS
Status: DISPENSED
Start: 2025-03-13